# Patient Record
Sex: MALE | Race: WHITE | NOT HISPANIC OR LATINO | Employment: OTHER | URBAN - METROPOLITAN AREA
[De-identification: names, ages, dates, MRNs, and addresses within clinical notes are randomized per-mention and may not be internally consistent; named-entity substitution may affect disease eponyms.]

---

## 2022-03-07 ENCOUNTER — LAB REQUISITION (OUTPATIENT)
Dept: LAB | Facility: CLINIC | Age: 62
End: 2022-03-07

## 2022-03-07 DIAGNOSIS — E11.49 TYPE 2 DIABETES MELLITUS WITH OTHER DIABETIC NEUROLOGICAL COMPLICATION (H): ICD-10-CM

## 2022-03-07 DIAGNOSIS — E78.00 PURE HYPERCHOLESTEROLEMIA, UNSPECIFIED: ICD-10-CM

## 2022-03-07 LAB
ALBUMIN SERPL-MCNC: 3.9 G/DL (ref 3.5–5)
ALP SERPL-CCNC: 147 U/L (ref 45–120)
ALT SERPL W P-5'-P-CCNC: 16 U/L (ref 0–45)
ANION GAP SERPL CALCULATED.3IONS-SCNC: 16 MMOL/L (ref 5–18)
AST SERPL W P-5'-P-CCNC: 10 U/L (ref 0–40)
BILIRUB SERPL-MCNC: 0.3 MG/DL (ref 0–1)
BUN SERPL-MCNC: 19 MG/DL (ref 8–22)
CALCIUM SERPL-MCNC: 9.9 MG/DL (ref 8.5–10.5)
CHLORIDE BLD-SCNC: 101 MMOL/L (ref 98–107)
CHOLEST SERPL-MCNC: 170 MG/DL
CO2 SERPL-SCNC: 21 MMOL/L (ref 22–31)
CREAT SERPL-MCNC: 1 MG/DL (ref 0.7–1.3)
GFR SERPL CREATININE-BSD FRML MDRD: 85 ML/MIN/1.73M2
GLUCOSE BLD-MCNC: 303 MG/DL (ref 70–125)
HDLC SERPL-MCNC: 41 MG/DL
LDLC SERPL CALC-MCNC: 61 MG/DL
LDLC SERPL CALC-MCNC: ABNORMAL MG/DL
POTASSIUM BLD-SCNC: 4.3 MMOL/L (ref 3.5–5)
PROT SERPL-MCNC: 6.7 G/DL (ref 6–8)
SODIUM SERPL-SCNC: 138 MMOL/L (ref 136–145)
TRIGL SERPL-MCNC: 500 MG/DL

## 2022-03-07 PROCEDURE — 80061 LIPID PANEL: CPT | Performed by: PHYSICIAN ASSISTANT

## 2022-03-07 PROCEDURE — 80053 COMPREHEN METABOLIC PANEL: CPT | Performed by: PHYSICIAN ASSISTANT

## 2022-03-07 PROCEDURE — 83721 ASSAY OF BLOOD LIPOPROTEIN: CPT | Performed by: PHYSICIAN ASSISTANT

## 2022-10-04 ENCOUNTER — MEDICAL CORRESPONDENCE (OUTPATIENT)
Dept: NEUROSURGERY | Facility: CLINIC | Age: 62
End: 2022-10-04

## 2022-10-05 ENCOUNTER — OFFICE VISIT (OUTPATIENT)
Dept: NEUROSURGERY | Facility: CLINIC | Age: 62
End: 2022-10-05
Payer: COMMERCIAL

## 2022-10-05 VITALS
HEIGHT: 70 IN | HEART RATE: 105 BPM | WEIGHT: 214 LBS | DIASTOLIC BLOOD PRESSURE: 95 MMHG | BODY MASS INDEX: 30.64 KG/M2 | OXYGEN SATURATION: 97 % | SYSTOLIC BLOOD PRESSURE: 145 MMHG

## 2022-10-05 DIAGNOSIS — H53.9 VISION DISTURBANCE: ICD-10-CM

## 2022-10-05 DIAGNOSIS — M54.2 NECK PAIN ON LEFT SIDE: Primary | ICD-10-CM

## 2022-10-05 PROCEDURE — 99204 OFFICE O/P NEW MOD 45 MIN: CPT | Performed by: NURSE PRACTITIONER

## 2022-10-05 RX ORDER — ATORVASTATIN CALCIUM 80 MG/1
80 TABLET, FILM COATED ORAL
COMMUNITY
Start: 2022-09-07 | End: 2024-01-11

## 2022-10-05 RX ORDER — CYCLOBENZAPRINE HCL 10 MG
10 TABLET ORAL 2 TIMES DAILY PRN
COMMUNITY
Start: 2022-03-21 | End: 2023-08-15

## 2022-10-05 RX ORDER — AMLODIPINE AND BENAZEPRIL HYDROCHLORIDE 5; 20 MG/1; MG/1
1 CAPSULE ORAL DAILY
COMMUNITY
Start: 2022-05-24 | End: 2023-08-15

## 2022-10-05 RX ORDER — SEMAGLUTIDE 1.34 MG/ML
INJECTION, SOLUTION SUBCUTANEOUS
COMMUNITY
Start: 2022-07-20 | End: 2023-12-08

## 2022-10-05 RX ORDER — CARVEDILOL 6.25 MG/1
1 TABLET ORAL 2 TIMES DAILY WITH MEALS
COMMUNITY
Start: 2022-03-30 | End: 2023-12-08

## 2022-10-05 RX ORDER — LOSARTAN POTASSIUM 25 MG/1
25 TABLET ORAL DAILY
COMMUNITY
Start: 2022-09-07 | End: 2023-08-15

## 2022-10-05 NOTE — PATIENT INSTRUCTIONS
Obtain films from [place]   Physical therapy   Spine center in [place] referral for injection possibility, pain control options   Neurology consult to establish care

## 2022-10-05 NOTE — PROGRESS NOTES
NEUROSURGERY CONSULTATION NOTE  10/5/2022     CHIEF COMPLAINT: neck pain    HPI:    Lito Malone is a 61 year old male who is sent to us in consultation by Dr Eason for evaluation of neck pain, back pain. Symptoms started after J&J COVID vaccine May 2021. He was hospitalized not being able to move his legs, significant neck pain and development of double vision. Back pain is better. Mobility is better but neck pain and vision disturbance persist. He is here to establish care. He brings extensive medical records from [place]. He had a very thorough work up. End result is some type of autoimmune disorder he has been told. He has not established with neurology or rheumatology here in the [facility]. He has not ever had any injections for pain. Has not had any recent PT. He has a history of ACDF C5-7 20 years ago. No pain until this past year 2021 after vaccine. History of two prior lumbar discectomies. Pain is present posterior midline neck, left shoulder radiating into left bicep, deltoid, tricep but pain does not go past the elbow. Occasionally in the right shoulder. No numbness or tingling. Equilibrium is altered - likely due to binocular diplopia. Wears eye patch. Has established with eye doctor in the [facility]. Low back not bothersome for 8-9 months. He has pain medication and muscle relaxer's he takes limitedly. In [place] saw neurology, neurosurgery, rheumatology and was told nothing to do. He declined participation in study regarding side effects of COVID vaccine. On exam he has pain limiting strength left arm, hand grasp. Based on image reports (not actual images) he has mild central stenosis C4-5, multilevel foraminal narrowing, moderate left C7-T1, severe left C6-7. I would like him to start with PT and see the providers at the [facility] for possible injection. We will obtain the actual films from [place] to review. Andres is happy with this plan. He will return to see us if the Spine Center thinks he  "would benefit from possible foraminotomy unless something different is noted on images once received.     MRI 11/3/2021 mild central canal stenosis C4-5, multilevel foraminal narrowing most significant C6-C7, C7-T1 based on radiology report not actual films   MRI brain 7/2021 negative   MRI lumbar degenerative changes     Facet arthrosis - C4-5 no stenosis noted on report     PLAN:   1. Spine Center   2. PT  3. Neurology - [facility] care   4. Get films from [place]   5. He was not interested in rheumatology     No past medical history on file.  No past surgical history on file.     REVIEW OF SYSTEMS:  Negative with exception of HPI     MEDICATIONS:  No current outpatient medications on file.         ALLERGIES/SENSITIVITIES:     Not on File    PERTINENT SOCIAL HISTORY:   Social History     Socioeconomic History     Marital status: Patient Declined         FAMILY HISTORY:  No family history on file.     PHYSICAL EXAM:     Constitution: BP (!) 145/95   Pulse 105   Ht 5' 10\" (1.778 m)   Wt 214 lb (97.1 kg)   SpO2 97%   BMI 30.71 kg/m      General: Awake, alert and in NAD     Motor: Normal bulk and tone all muscle groups of upper and lower extremities.     Strength:     Right Left  Right Left   Deltoid 5 5 Hip flexion 5 5   Biceps 5 5 Hip extension 5 5   Triceps 5 5 Knee flexion 5 5   Wrist ex 5 5 Knee ex 5 5   Wrist flex 5 5 Dorsiflex 5 5   Finger ex 5 5 Plantar flex 5 5   Hand intrinsic 5 4 EHL 5 5    Full 4+         Sensory: Sensation intact bilaterally to light touch and temperature throughout.      Coordination:  Gait is WNL.      Reflexes; 2+ supinator, biceps, triceps. 2+ patellar and achilles. No milan. No clonus. Toes are down-going bilaterally    Musculoskeletal: Negative straight leg raise bilaterally. Negative TRAVIS testing. Negative Lisandro finger test    IMAGING: I personally reviewed some of the reports of multiple images from [place] of brain, cervical, lumbar.     CONSULTATION ASSESSMENT " AND PLAN:    Lito Malone is a 61 year old male who is sent to us in consultation by Dr Eason for evaluation of neck pain, back pain. Symptoms started after J&J COVID vaccine May 2021. He has a history of ACDF C5-7 20 years ago. No pain until this past year 2021 after vaccine. History of two prior lumbar discectomies. Pain is present posterior midline neck, left shoulder radiating into left bicep, deltoid, tricep but does not go past the elbow. Occasionally in the right shoulder. No numbness or tingling. Equilibrium is altered - likely due to binocular diplopia. Wears eye patch. Has established with eye doctor in the [facility]. Low back not bothersome for 8-9 months. He has pain medication and muscle relaxer's he takes limitedly. In [place] saw neurology, neurosurgery, rheumatology and was told nothing to do. He declined participation in study regarding side effects of COVID vaccine. On exam he has pain limiting strength left arm, hand grasp. Based on image reports (not actual images) He has mild central stenosis C4-5, multilevel foraminal narrowing most significant C6-C7, C7-T1. I would like him to start with PT and see the providers at the [facility] Center for possible injection. We will obtain the actual films from [place] to review. Andres is happy with this plan. He will return to see us if the Spine Center feels he would benefit from foraminotomy unless something else shows up once images reviewed. The distribution of his symptoms do not necessarily fit his image report.     MRI 11/3/2021 mild central canal stenosis C4-5 multifocal neural foraminal stenosis. Moderate left C7-T1. Severe left C6-7.   MRI brain 7/2021 negative   MRI lumbar degenerative changes      Rakel Arita, APRN CNP   [facility]        Cc:   No primary care provider on file.

## 2022-10-07 ENCOUNTER — MEDICAL CORRESPONDENCE (OUTPATIENT)
Dept: NEUROSURGERY | Facility: CLINIC | Age: 62
End: 2022-10-07

## 2022-10-18 ENCOUNTER — MEDICAL CORRESPONDENCE (OUTPATIENT)
Dept: NEUROSURGERY | Facility: CLINIC | Age: 62
End: 2022-10-18

## 2022-11-02 ENCOUNTER — OFFICE VISIT (OUTPATIENT)
Dept: PHYSICAL MEDICINE AND REHAB | Facility: CLINIC | Age: 62
End: 2022-11-02
Payer: COMMERCIAL

## 2022-11-02 VITALS
BODY MASS INDEX: 30.64 KG/M2 | SYSTOLIC BLOOD PRESSURE: 178 MMHG | DIASTOLIC BLOOD PRESSURE: 86 MMHG | HEART RATE: 97 BPM | HEIGHT: 70 IN | WEIGHT: 214 LBS

## 2022-11-02 DIAGNOSIS — M25.512 CHRONIC LEFT SHOULDER PAIN: ICD-10-CM

## 2022-11-02 DIAGNOSIS — M47.812 ARTHROPATHY OF CERVICAL FACET JOINT: ICD-10-CM

## 2022-11-02 DIAGNOSIS — G89.29 CHRONIC LEFT SHOULDER PAIN: ICD-10-CM

## 2022-11-02 DIAGNOSIS — M79.18 MYOFASCIAL PAIN: ICD-10-CM

## 2022-11-02 DIAGNOSIS — M54.2 CERVICAL SPINE PAIN: Primary | ICD-10-CM

## 2022-11-02 PROCEDURE — 99204 OFFICE O/P NEW MOD 45 MIN: CPT | Performed by: PHYSICAL MEDICINE & REHABILITATION

## 2022-11-02 RX ORDER — GABAPENTIN 100 MG/1
100 CAPSULE ORAL 3 TIMES DAILY
COMMUNITY
End: 2023-08-15

## 2022-11-02 RX ORDER — BACLOFEN 10 MG/1
10 TABLET ORAL 3 TIMES DAILY PRN
Qty: 60 TABLET | Refills: 1 | Status: SHIPPED | OUTPATIENT
Start: 2022-11-02 | End: 2023-08-17

## 2022-11-02 ASSESSMENT — PAIN SCALES - GENERAL: PAINLEVEL: MODERATE PAIN (5)

## 2022-11-02 NOTE — PATIENT INSTRUCTIONS
A physical therapy order was provided for you today.  You will be contacted by physical therapy.  If nobody contacts you within 3 to 5 days, please contact the clinic at [phone].  It will be very important for you to do your physical therapy exercises on a regular basis to decrease your pain and prevent future pain flares.   Baclofen (muscle relaxant medication) has been prescribed today. Please take 5-10mg three times daily as needed. This medication may cause drowsiness. Please do not work or drive while taking this medication until you know how it effects you. If it does make you drowsy, you should only take it before bedtime or at times that you do not have to work/drive.   An xray was ordered for you today.  Please call Radiology at [phone].     Get MRI images from [place]

## 2022-11-02 NOTE — LETTER
11/2/2022         RE: Lito Malone  [address] [address]        Dear Colleague,    Thank you for referring your patient, Lito Malone, to the [facility]. Please see a copy of my visit note below.    Assessment/Plan:      Lito was seen today for neck pain.    Diagnoses and all orders for this visit:    Cervical spine pain  -     XR Cervical Spine Flex/Ext 2/3 Views; Future  -     XR Shoulder Left 2 Views; Future  -     baclofen (LIORESAL) 10 MG tablet; Take 1 tablet (10 mg) by mouth 3 times daily as needed for muscle spasms  -     Physical Therapy Referral; Future    Myofascial pain  -     XR Shoulder Left 2 Views; Future  -     baclofen (LIORESAL) 10 MG tablet; Take 1 tablet (10 mg) by mouth 3 times daily as needed for muscle spasms  -     Physical Therapy Referral; Future    Arthropathy of cervical facet joint  -     XR Cervical Spine Flex/Ext 2/3 Views; Future  -     Physical Therapy Referral; Future    Chronic left shoulder pain  -     XR Shoulder Left 2 Views; Future  -     Physical Therapy Referral; Future    Other orders  -     Spine  Referral         Assessment: Pleasant 61 year old male with a history of hypertension, hyperlipidemia, diabetes mellitus, history of myocardial infarction with:    1.  Chronic cervical spine pain increased over the past year at the cervical thoracic junction.  He is status post C5-7 ACDF in the year 2000.  He has severe facet arthropathy at C7-T1 on MRI report no images available for my review.  Pain is consistent with facet arthropathy at that level along with myofascial pain.    2.  Chronic left shoulder pain with decreased internal and external rotation consistent with glenohumeral joint osteoarthritis.  Likely contributing to his pain.          Discussion:    1.   I discussed the diagnosis and treatment options.  We discussed the need for images.  I have no images available for my review and he  has had an MRI a year ago in [place].  We also discussed the options of physical therapy.    2.  Plain film cervical spine flexion-extension to evaluate for any instability and integrity of the fusion.    3.  X-rays of left shoulder to evaluate extent of glenohumeral joint osteoarthritis.    4.  Trial baclofen 5 to 10 mg 3 times daily as needed for myofascial pain.  He is going to start this at night.    5.  We will have him sign a release to obtain the MRI images of his cervical spine and lumbar spine from [place].    6.  Start physical therapy for cervical and parascapular strengthening stabilization.    7.  Follow-up with me in 1 month.    It was our pleasure caring for your patient today, if there any questions or concerns please do not hesitate to contact us.      Subjective:   Patient ID: Lito Malone is a 61 year old male.    History of Present Illness:Patient presents at the request of Rakel Arita CNP for evaluation of cervical spine pain.  He states he had a C5-7 fusion in the year 2000 was living in [place].  Was doing well until had the Roney & Roney vaccine over a year ago.  He then developed double vision issues was hospitalized and sent ago diagnosed with an autoimmune dysfunction following vaccine.  He wears an eye patch in his right eye due to double vision.    About 1 year ago during this hospitalization episode with the vaccine he began to have cervical spine pain waxes and wanes in intensity through the cervical spine upper trapezius and into the left shoulder deltoids greater than right.  He has left shoulder pain worse with pushing up from seated or supine.  Better with ibuprofen.  Feels weakness to the shoulders.  His neck pain is intermittent as well but most severe at the cervical thoracic junction bilaterally left is much worse than right.  Worse with turning his head.  Has occasional numbness and tingling to the right hand all the fingertips.  He has poor sleep.  His pain  is a 10/10 at worst 5/10 today 4/10 at best.  He has moved to [place] to help care for his mother.      Imaging: MRI Report of the cervical spine was reviewed.  No images available.  This is from November 2021 from [place].  Report reads advanced facet arthropathy C7-T1 no central stenosis.  Prior C5-7 ACDF mild central stenosis C4-5.    Review of Systems: Complains of headaches, joint pain, muscle pain, change in vision, skin wound issues and dizziness.  Denies fevers, chills, weight loss or weight gain, hoarseness, eye pain, chest pain, palpitations, shortness of breath, abdominal pain, nausea, vomiting, enlarged lymph nodes, bowel or bladder incontinence, depression. Remainder of 12 point review systems negative unless listed above.    Past Medical History:   Diagnosis Date     Diabetes (H)      Heart disease      Hyperlipidemia      Hypertension        Family History   Problem Relation Age of Onset     Diabetes Father      Coronary Artery Disease Father          Social History     Socioeconomic History     Marital status: Patient Declined     Spouse name: None     Number of children: None     Years of education: None     Highest education level: None   Tobacco Use     Smoking status: Every Day     Smokeless tobacco: Former   Substance and Sexual Activity     Alcohol use: Not Currently     Drug use: Never     Social history: Moved back from [place] to take care of his mother.  Currently not working.  Does smoke cigarettes encourage smoking cessation.  No alcohol.    The following portions of the patient's history were reviewed and updated as appropriate: allergies, current medications, past family history, past medical history, past social history, past surgical history and problem list.    Oswestry (WINNIE) Questionnaire    No flowsheet data found.    Neck Disability Index:  Neck Disability Index (  [employer]. [employer] Pina C. 1991. All rights reserved.; used with permission) 11/2/2022   SECTION 1 - PAIN  "INTENSITY 2   SECTION 2 - PERSONAL CARE 0   SECTION 3 - LIFTING 2   SECTION 4 - READING 2   SECTION 5 - HEADACHES 3   SECTION 6 - CONCENTRATION 0   SECTION 7 - WORK 3   SECTION 8 - DRIVING 2   SECTION 9 - SLEEPING 3   SECTION 10 - RECREATION 4   Count 10   Sum 21   Raw Score: /50 21   Neck Disability Index Score: (%) 42          PHQ-2 Score:     PHQ-2 ( 1999 [employer]) 11/2/2022   Q1: Little interest or pleasure in doing things 0   Q2: Feeling down, depressed or hopeless 0   PHQ-2 Score 0                  Objective:   Physical Exam:    Ht 5' 10\" (1.778 m)   Wt 214 lb (97.1 kg)   BMI 30.71 kg/m    Body mass index is 30.71 kg/m .      General:  Well-appearing male in no acute distress.  Pleasant, cooperative, and interactive throughout the examination and interview.  CV: No lower extremity edema on inspection or paltation.  Lymphatics: No cervical lymphadenopathy palpated.  Eyes: sclera clear bilaterally but wears eye patch on right eye for double vision.  Skin: No rashes or lesions seen over the head/neck, hairline, arms, legs .  Respirations unlabored.  MSK: Gait is normal.  Able to heel-toe walk without difficulty.  Negative Romberg.  Spine: normal AP curves of the C, T, and L spine.  Full range of motion in the cervical spine in all planes.  Palpation: Tenderness to palpation over cervical thoracic junction bilaterally over the facets and along the upper trapezius to a lesser degree with hypertonic tissue textures.  Extremities: Decreased range of motion left greater than right shoulder abduction and significantly reduced external rotation and internal rotation of left shoulder.  Pain arm drop empty can and speeds test on the left.  Full range of motion of the  elbows, and wrists with no effusions or tenderness to palpation.   .  Full range of motion of the  knees, and ankles from a seated position with no effusions or tenderness to palpation. No hypermobility of the upper or lower extremities.  Neurologic exam: " Mental status: Patient is alert and oriented with normal affect.  Attention, knowledge, memory, and language are intact.  Normal coordination throughout the examination.  Reflexes are 2+ and symmetric biceps, triceps, brachioradialis, patellar, and 0 Achilles with Negative Alejandra's.  Sensation is slightly reduced to light touch bilateral feet to the ankles, intact to light touch throughout the remainder of upper and lower extremities bilaterally.  Manual muscle testing reveals 5 out of 5 strength in the shoulder abductors, elbow flexors/extensors, wrist extensors, interosseous, and finger flexors; lower extremity strength appears grossly normal.   Normal muscle bulk and tone in the arms and legs.  Negative Spurling's test bilaterally.  Positive Copeland test to the left cervical spine.            Again, thank you for allowing me to participate in the care of your patient.        Sincerely,        Max Kong, DO

## 2022-11-02 NOTE — PROGRESS NOTES
Assessment/Plan:      Lito was seen today for neck pain.    Diagnoses and all orders for this visit:    Cervical spine pain  -     XR Cervical Spine Flex/Ext 2/3 Views; Future  -     XR Shoulder Left 2 Views; Future  -     baclofen (LIORESAL) 10 MG tablet; Take 1 tablet (10 mg) by mouth 3 times daily as needed for muscle spasms  -     Physical Therapy Referral; Future    Myofascial pain  -     XR Shoulder Left 2 Views; Future  -     baclofen (LIORESAL) 10 MG tablet; Take 1 tablet (10 mg) by mouth 3 times daily as needed for muscle spasms  -     Physical Therapy Referral; Future    Arthropathy of cervical facet joint  -     XR Cervical Spine Flex/Ext 2/3 Views; Future  -     Physical Therapy Referral; Future    Chronic left shoulder pain  -     XR Shoulder Left 2 Views; Future  -     Physical Therapy Referral; Future    Other orders  -     Spine  Referral         Assessment: Pleasant 61 year old male with a history of hypertension, hyperlipidemia, diabetes mellitus, history of myocardial infarction with:    1.  Chronic cervical spine pain increased over the past year at the cervical thoracic junction.  He is status post C5-7 ACDF in the year 2000.  He has severe facet arthropathy at C7-T1 on MRI report no images available for my review.  Pain is consistent with facet arthropathy at that level along with myofascial pain.    2.  Chronic left shoulder pain with decreased internal and external rotation consistent with glenohumeral joint osteoarthritis.  Likely contributing to his pain.          Discussion:    1.   I discussed the diagnosis and treatment options.  We discussed the need for images.  I have no images available for my review and he has had an MRI a year ago in [place].  We also discussed the options of physical therapy.    2.  Plain film cervical spine flexion-extension to evaluate for any instability and integrity of the fusion.    3.  X-rays of left shoulder to evaluate extent of glenohumeral  joint osteoarthritis.    4.  Trial baclofen 5 to 10 mg 3 times daily as needed for myofascial pain.  He is going to start this at night.    5.  We will have him sign a release to obtain the MRI images of his cervical spine and lumbar spine from [place].    6.  Start physical therapy for cervical and parascapular strengthening stabilization.    7.  Follow-up with me in 1 month.    It was our pleasure caring for your patient today, if there any questions or concerns please do not hesitate to contact us.      Subjective:   Patient ID: Lito Malone is a 61 year old male.    History of Present Illness:Patient presents at the request of Rakel Arita CNP for evaluation of cervical spine pain.  He states he had a C5-7 fusion in the year 2000 was living in [place].  Was doing well until had the Roney & Roney vaccine over a year ago.  He then developed double vision issues was hospitalized and sent ago diagnosed with an autoimmune dysfunction following vaccine.  He wears an eye patch in his right eye due to double vision.    About 1 year ago during this hospitalization episode with the vaccine he began to have cervical spine pain waxes and wanes in intensity through the cervical spine upper trapezius and into the left shoulder deltoids greater than right.  He has left shoulder pain worse with pushing up from seated or supine.  Better with ibuprofen.  Feels weakness to the shoulders.  His neck pain is intermittent as well but most severe at the cervical thoracic junction bilaterally left is much worse than right.  Worse with turning his head.  Has occasional numbness and tingling to the right hand all the fingertips.  He has poor sleep.  His pain is a 10/10 at worst 5/10 today 4/10 at best.  He has moved to [place] to help care for his mother.      Imaging: MRI Report of the cervical spine was reviewed.  No images available.  This is from November 2021 from [place].  Report reads advanced facet arthropathy  C7-T1 no central stenosis.  Prior C5-7 ACDF mild central stenosis C4-5.    Review of Systems: Complains of headaches, joint pain, muscle pain, change in vision, skin wound issues and dizziness.  Denies fevers, chills, weight loss or weight gain, hoarseness, eye pain, chest pain, palpitations, shortness of breath, abdominal pain, nausea, vomiting, enlarged lymph nodes, bowel or bladder incontinence, depression. Remainder of 12 point review systems negative unless listed above.    Past Medical History:   Diagnosis Date     Diabetes (H)      Heart disease      Hyperlipidemia      Hypertension        Family History   Problem Relation Age of Onset     Diabetes Father      Coronary Artery Disease Father          Social History     Socioeconomic History     Marital status: Patient Declined     Spouse name: None     Number of children: None     Years of education: None     Highest education level: None   Tobacco Use     Smoking status: Every Day     Smokeless tobacco: Former   Substance and Sexual Activity     Alcohol use: Not Currently     Drug use: Never     Social history: Moved back from [place] to take care of his mother.  Currently not working.  Does smoke cigarettes encourage smoking cessation.  No alcohol.    The following portions of the patient's history were reviewed and updated as appropriate: allergies, current medications, past family history, past medical history, past social history, past surgical history and problem list.    Oswestry (WINNIE) Questionnaire    No flowsheet data found.    Neck Disability Index:  Neck Disability Index (  [employer] H. and Pina C. 1991. All rights reserved.; used with permission) 11/2/2022   SECTION 1 - PAIN INTENSITY 2   SECTION 2 - PERSONAL CARE 0   SECTION 3 - LIFTING 2   SECTION 4 - READING 2   SECTION 5 - HEADACHES 3   SECTION 6 - CONCENTRATION 0   SECTION 7 - WORK 3   SECTION 8 - DRIVING 2   SECTION 9 - SLEEPING 3   SECTION 10 - RECREATION 4   Count 10   Sum 21   Raw Score:  "/50 21   Neck Disability Index Score: (%) 42          PHQ-2 Score:     PHQ-2 ( 1999 Pfizer) 11/2/2022   Q1: Little interest or pleasure in doing things 0   Q2: Feeling down, depressed or hopeless 0   PHQ-2 Score 0                  Objective:   Physical Exam:    Ht 5' 10\" (1.778 m)   Wt 214 lb (97.1 kg)   BMI 30.71 kg/m    Body mass index is 30.71 kg/m .      General:  Well-appearing male in no acute distress.  Pleasant, cooperative, and interactive throughout the examination and interview.  CV: No lower extremity edema on inspection or paltation.  Lymphatics: No cervical lymphadenopathy palpated.  Eyes: sclera clear bilaterally but wears eye patch on right eye for double vision.  Skin: No rashes or lesions seen over the head/neck, hairline, arms, legs .  Respirations unlabored.  MSK: Gait is normal.  Able to heel-toe walk without difficulty.  Negative Romberg.  Spine: normal AP curves of the C, T, and L spine.  Full range of motion in the cervical spine in all planes.  Palpation: Tenderness to palpation over cervical thoracic junction bilaterally over the facets and along the upper trapezius to a lesser degree with hypertonic tissue textures.  Extremities: Decreased range of motion left greater than right shoulder abduction and significantly reduced external rotation and internal rotation of left shoulder.  Pain arm drop empty can and speeds test on the left.  Full range of motion of the  elbows, and wrists with no effusions or tenderness to palpation.   .  Full range of motion of the  knees, and ankles from a seated position with no effusions or tenderness to palpation. No hypermobility of the upper or lower extremities.  Neurologic exam: Mental status: Patient is alert and oriented with normal affect.  Attention, knowledge, memory, and language are intact.  Normal coordination throughout the examination.  Reflexes are 2+ and symmetric biceps, triceps, brachioradialis, patellar, and 0 Achilles with Negative " Alejandra's.  Sensation is slightly reduced to light touch bilateral feet to the ankles, intact to light touch throughout the remainder of upper and lower extremities bilaterally.  Manual muscle testing reveals 5 out of 5 strength in the shoulder abductors, elbow flexors/extensors, wrist extensors, interosseous, and finger flexors; lower extremity strength appears grossly normal.   Normal muscle bulk and tone in the arms and legs.  Negative Spurling's test bilaterally.  Positive Copeland test to the left cervical spine.

## 2022-11-08 ENCOUNTER — HOSPITAL ENCOUNTER (OUTPATIENT)
Dept: RADIOLOGY | Facility: CLINIC | Age: 62
Discharge: HOME | End: 2022-11-08
Attending: PHYSICAL MEDICINE & REHABILITATION
Payer: COMMERCIAL

## 2022-11-08 DIAGNOSIS — M25.512 CHRONIC LEFT SHOULDER PAIN: ICD-10-CM

## 2022-11-08 DIAGNOSIS — G89.29 CHRONIC LEFT SHOULDER PAIN: ICD-10-CM

## 2022-11-08 DIAGNOSIS — M79.18 MYOFASCIAL PAIN: ICD-10-CM

## 2022-11-08 DIAGNOSIS — M47.812 ARTHROPATHY OF CERVICAL FACET JOINT: ICD-10-CM

## 2022-11-08 DIAGNOSIS — M54.2 CERVICAL SPINE PAIN: ICD-10-CM

## 2022-11-08 PROCEDURE — 73030 X-RAY EXAM OF SHOULDER: CPT | Mod: LT

## 2022-11-08 PROCEDURE — 72040 X-RAY EXAM NECK SPINE 2-3 VW: CPT

## 2023-01-06 ENCOUNTER — LAB REQUISITION (OUTPATIENT)
Dept: LAB | Facility: CLINIC | Age: 63
End: 2023-01-06

## 2023-01-06 DIAGNOSIS — E11.65 TYPE 2 DIABETES MELLITUS WITH HYPERGLYCEMIA (H): ICD-10-CM

## 2023-01-06 LAB
CREAT UR-MCNC: 47 MG/DL
MICROALBUMIN UR-MCNC: 611 MG/L
MICROALBUMIN/CREAT UR: 1300 MG/G CR (ref 0–17)

## 2023-01-06 PROCEDURE — 82570 ASSAY OF URINE CREATININE: CPT | Performed by: PHYSICIAN ASSISTANT

## 2023-05-05 ENCOUNTER — LAB REQUISITION (OUTPATIENT)
Dept: LAB | Facility: CLINIC | Age: 63
End: 2023-05-05

## 2023-05-05 DIAGNOSIS — E78.00 PURE HYPERCHOLESTEROLEMIA, UNSPECIFIED: ICD-10-CM

## 2023-05-05 DIAGNOSIS — I10 ESSENTIAL (PRIMARY) HYPERTENSION: ICD-10-CM

## 2023-05-05 LAB
ANION GAP SERPL CALCULATED.3IONS-SCNC: 13 MMOL/L (ref 7–15)
BUN SERPL-MCNC: 13.4 MG/DL (ref 8–23)
CALCIUM SERPL-MCNC: 9.7 MG/DL (ref 8.8–10.2)
CHLORIDE SERPL-SCNC: 100 MMOL/L (ref 98–107)
CHOLEST SERPL-MCNC: 96 MG/DL
CREAT SERPL-MCNC: 0.96 MG/DL (ref 0.67–1.17)
DEPRECATED HCO3 PLAS-SCNC: 27 MMOL/L (ref 22–29)
GFR SERPL CREATININE-BSD FRML MDRD: 88 ML/MIN/1.73M2
GLUCOSE SERPL-MCNC: 185 MG/DL (ref 70–99)
HDLC SERPL-MCNC: 41 MG/DL
LDLC SERPL CALC-MCNC: 36 MG/DL
NONHDLC SERPL-MCNC: 55 MG/DL
POTASSIUM SERPL-SCNC: 5 MMOL/L (ref 3.4–5.3)
SODIUM SERPL-SCNC: 140 MMOL/L (ref 136–145)
TRIGL SERPL-MCNC: 95 MG/DL

## 2023-05-05 PROCEDURE — 80048 BASIC METABOLIC PNL TOTAL CA: CPT | Performed by: PHYSICIAN ASSISTANT

## 2023-05-05 PROCEDURE — 80061 LIPID PANEL: CPT | Performed by: PHYSICIAN ASSISTANT

## 2023-05-22 ENCOUNTER — OFFICE VISIT (OUTPATIENT)
Dept: NEUROLOGY | Facility: CLINIC | Age: 63
End: 2023-05-22
Attending: NURSE PRACTITIONER
Payer: COMMERCIAL

## 2023-05-22 VITALS — SYSTOLIC BLOOD PRESSURE: 140 MMHG | DIASTOLIC BLOOD PRESSURE: 85 MMHG | HEART RATE: 95 BPM

## 2023-05-22 DIAGNOSIS — G61.0 AIDP (ACUTE INFLAMMATORY DEMYELINATING POLYNEUROPATHY) (H): Primary | ICD-10-CM

## 2023-05-22 DIAGNOSIS — R13.10 DYSPHAGIA, UNSPECIFIED TYPE: ICD-10-CM

## 2023-05-22 DIAGNOSIS — H53.9 VISION DISTURBANCE: ICD-10-CM

## 2023-05-22 PROCEDURE — 99205 OFFICE O/P NEW HI 60 MIN: CPT | Performed by: PSYCHIATRY & NEUROLOGY

## 2023-05-22 NOTE — PATIENT INSTRUCTIONS
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  Your exam is reassuring, but overall I think we should have you repeat an EMG given your hand dermatome psoriasis and ongoing sensory loss of the legs.    - EMG one lower and one upper  - SLP

## 2023-05-22 NOTE — PROGRESS NOTES
[facility] Neurology Consultation    Lito Malone MRN# [id number]   Age: 62 year old YOB: 1960     Requesting physician: Ike Jung     Reason for Consultation: neck pain      History of Presenting Symptoms:   Lito Malone is a 62 year old male who presents today for evaluation of neck pain.    The patient has a pertinent medical history of ACDF C5-7 some 20 years ago, two lumbar discectomies, DMII, HLD, STEMI (8/28/2022), and HTN.  Around 5/2021, after having the J&J COVID vaccine the patient was hospitalized fro not being able to move his legs, having neck pain, and diplopia while in [place].  When seen with Neurosurgery 10/5/2022, it was noted he had midline neck pain radiating into the left biceps,deltoid but without sensory loss with pain limiting strength on exam.  Imaging (MRI 11/3/2021) showed mild central canal stenosis at C4-5, and foraminal narrowing at C6-7, C7-T1.  MRI brain 7/2021 was reported as negative.  He was referred to establish care with neurology given his medical history.    Upon review, the patient was seen with ophthalmology 7/25/2022 were it was noted he had numerous MRI and CT scans for diplopia and weakness in his legs while in [place] 5/2021 and was told he had Guillain [place] by a neurologist, likely secondary to the vaccine as an autoimmune response.    Today, the patient indicate he was treated for AIDP with IVIG x 5 days when in [place].  He tells me he had LP, MRI, and was told he had an autoimmune response to the vaccine but was told he didn't have Guillain barre.  He did have an EMG, but doesn't recall the results.  He shows me today that he has psoriasis on his hands, which occurred 6 months following this vaccine.    He feels his weakness has improved to the point of walking, but still has balance and equilibrium issues (more to do with eyes).  He can get dizzy when taking a shower.  He doesn't really have falls, and still  has some foot drag at times.  He does confirm he has some minor neuropathy prior to the vaccine (may have weird or odd sensation in the feet b/l, no weakness).  He has some swallowing issues (he may have a pause/hiccup when swallowing) in the last year.  There are no new visual issues at this time.     Social History:   No major alcohol use disorder.       Medications:   Amlodipine  ASA 81 mg   Atorvastatin  Baclofen 10 mg TID  Carvedilol  Cyclobenzaprine 10 mg every day  Gabapentin 100 mg TID  Losartan  Metformin     Physical Exam:   Vitals: BP (!) 140/85 (BP Location: Right arm, Patient Position: Sitting)   Pulse 95    General: Seated comfortably in no acute distress.  HEENT: Neck supple with normal range of motion, no specific weakness noted.  Skin: No rashes  Neurologic:     Mental Status: Fully alert, attentive and oriented. Speech clear and fluent, no paraphasic errors.      Cranial Nerves: Visual fields intact, but horizontal diplopia is noted in central vision (If left eye is closed then the right field image goes away. If right eye is closed then the left field image goes away). PERRL. EOMI with vertical nystagmus with all motions (left gaze, right gaze, up-gaze) but no restriction of movement themselves is noted.  Facial sensation intact/symmetric. Facial movements symmetric. Hearing not formally tested but intact to conversation. Palate elevation symmetric, uvula midline. No dysarthria. Shoulder shrug strong bilaterally. Tongue protrusion midline. Pa-Portillo-Cah phoneme alteration is normal/w-out slurring.     Motor: No tremors or other abnormal movements observed. Muscle tone normal throughout. No pronator drift. Normal/symmetric rapid finger tapping. Strength 5/5 throughout upper and lower extremities except for mild (4+) weakness noted on right eversion.     Deep Tendon Reflexes: 2+/symmetric throughout upper and lower extremities. No clonus. Toes downgoing bilaterally.     Sensory: Upper extremities  without any deficits in sensation (light touch, vibration, pinprick, or temperature b/l) but there is a noted vesicular rash over the palmar surface median distribution present b/l.  In the lower extremities, there is spotty loss of pinprick and light touch differentiation (distal toes present, but lateral portion of right leg is poor while left leg is present, heel is poor b/l, MM is poor on left but present on right, mid-leg b/l is poor). Vibration is absent on right great toe but present for 2-4 seconds on left, vibration is absent on left MM but present 2-4 seconds on right, vibration reduced to 6-8 seconds at mid-leg and knees b/l. Proprioception intact 2/3 responses b/l at great toes. Temperature poor on right and left distal feet. Positive Romberg.      Coordination: Finger-nose-finger intact without dysmetria. Rapid alternating movements intact/symmetric with normal speed and rhythm.     Gait: Stands easily from seated position with arms across chest. Toe walking and heel walking without difficulty. Tandem walking is extremely poor with noted extra steps needed and crossing over at times on either side.         Data: Pertinent prior to visit   Imaging:  Reviewed today. Minor flair hyper-intensities are noted of periaqueductal grey matter, but could be artifact.  No infarction, tumor, or even a greater than expected degree of white matter microvascular changes are noted.    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  He has made an excellent recovery without noted respiratory dysfunction being noted, but still has some remaining diplopia and weakness in the distal extremities along with sensory loss that  seems mixed in a distal symmetric pattern.  I am not certain of the yield or reasoning behind obtaining ganglioside brendan panels at this time without having an EMG, so I would like this to be done with a close follow up.  I also am not certain as to the rash of his hands coming on following the vaccine without sensory loss or weakness but of which is in a median nerve distribution.  If these issues are linked, I think there is reason enough to warrant advanced autoimmune related marker testing through us or rheumatology.  Given his reports of a slight hiccup with his swallowing, I would want him to obtain a swallow study through our speech specialist.    Plan:  - EMG one arm, one leg (with Dr. Beckman)  - SLP evaluation for swallow    Follow up in Neurology clinic in 3 months or should new concerns arise.    SARIAH Garcia D.O.   of Neurology    Total time today (78 min) in this patient encounter was spent on pre-charting, counseling and/or coordination of care.  The patient is in agreement with this plan and has no further questions.

## 2023-05-22 NOTE — NURSING NOTE
Chief Complaint   Patient presents with     Neck Pain     Referred by SHRAVAN Matos RMA on 5/22/2023 at 7:05 AM

## 2023-05-22 NOTE — LETTER
5/22/2023         RE: Lito Malone  [address] [address]        Dear Colleague,    Thank you for referring your patient, Lito Malone, to the [facility] [place]. Please see a copy of my visit note below.    [facility] Neurology Consultation    Lito Malone MRN# [id number]   Age: 62 year old YOB: 1960     Requesting physician: Ike Jung     Reason for Consultation: neck pain      History of Presenting Symptoms:   Lito Malone is a 62 year old male who presents today for evaluation of neck pain.    The patient has a pertinent medical history of ACDF C5-7 some 20 years ago, two lumbar discectomies, DMII, HLD, STEMI (8/28/2022), and HTN.  Around 5/2021, after having the J&J COVID vaccine the patient was hospitalized fro not being able to move his legs, having neck pain, and diplopia while in [place].  When seen with Neurosurgery 10/5/2022, it was noted he had midline neck pain radiating into the left biceps,deltoid but without sensory loss with pain limiting strength on exam.  Imaging (MRI 11/3/2021) showed mild central canal stenosis at C4-5, and foraminal narrowing at C6-7, C7-T1.  MRI brain 7/2021 was reported as negative.  He was referred to establish care with neurology given his medical history.    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    Today, the patient indicate he was treated for AIDP with IVIG x 5 days when in [place].  He tells me he had LP, MRI, and was told he had an autoimmune response to the vaccine but was told he didn't have Guillain barre.  He did have an EMG, but doesn't recall the results.  He shows me today that he has  psoriasis on his hands, which occurred 6 months following this vaccine.    He feels his weakness has improved to the point of walking, but still has balance and equilibrium issues (more to do with eyes).  He can get dizzy when taking a shower.  He doesn't really have falls, and still has some foot drag at times.  He does confirm he has some minor neuropathy prior to the vaccine (may have weird or odd sensation in the feet b/l, no weakness).  He has some swallowing issues (he may have a pause/hiccup when swallowing) in the last year.  There are no new visual issues at this time.     Social History:   No major alcohol use disorder.       Medications:   Amlodipine  ASA 81 mg   Atorvastatin  Baclofen 10 mg TID  Carvedilol  Cyclobenzaprine 10 mg every day  Gabapentin 100 mg TID  Losartan  Metformin     Physical Exam:   Vitals: BP (!) 140/85 (BP Location: Right arm, Patient Position: Sitting)   Pulse 95    General: Seated comfortably in no acute distress.  HEENT: Neck supple with normal range of motion, no specific weakness noted.  Skin: No rashes  Neurologic:     Mental Status: Fully alert, attentive and oriented. Speech clear and fluent, no paraphasic errors.      Cranial Nerves: Visual fields intact, but horizontal diplopia is noted in central vision (If left eye is closed then the right field image goes away. If right eye is closed then the left field image goes away). PERRL. EOMI with vertical nystagmus with all motions (left gaze, right gaze, up-gaze) but no restriction of movement themselves is noted.  Facial sensation intact/symmetric. Facial movements symmetric. Hearing not formally tested but intact to conversation. Palate elevation symmetric, uvula midline. No dysarthria. Shoulder shrug strong bilaterally. Tongue protrusion midline. Pa-Portillo-Cah phoneme alteration is normal/w-out slurring.     Motor: No tremors or other abnormal movements observed. Muscle tone normal throughout. No pronator drift.  Normal/symmetric rapid finger tapping. Strength 5/5 throughout upper and lower extremities except for mild (4+) weakness noted on right eversion.     Deep Tendon Reflexes: 2+/symmetric throughout upper and lower extremities. No clonus. Toes downgoing bilaterally.     Sensory: Upper extremities without any deficits in sensation (light touch, vibration, pinprick, or temperature b/l) but there is a noted vesicular rash over the palmar surface median distribution present b/l.  In the lower extremities, there is spotty loss of pinprick and light touch differentiation (distal toes present, but lateral portion of right leg is poor while left leg is present, heel is poor b/l, MM is poor on left but present on right, mid-leg b/l is poor). Vibration is absent on right great toe but present for 2-4 seconds on left, vibration is absent on left MM but present 2-4 seconds on right, vibration reduced to 6-8 seconds at mid-leg and knees b/l. Proprioception intact 2/3 responses b/l at great toes. Temperature poor on right and left distal feet. Positive Romberg.      Coordination: Finger-nose-finger intact without dysmetria. Rapid alternating movements intact/symmetric with normal speed and rhythm.     Gait: Stands easily from seated position with arms across chest. Toe walking and heel walking without difficulty. Tandem walking is extremely poor with noted extra steps needed and crossing over at times on either side.         Data: Pertinent prior to visit   Imaging:  Reviewed today. Minor flair hyper-intensities are noted of periaqueductal grey matter, but could be artifact.  No infarction, tumor, or even a greater than expected degree of white matter microvascular changes are noted.    Laboratory:  Cannot review [place] results today (no EMG, LP, or serum studies available)         Assessment and Plan:   Assessment:  AIDP 2/2 vaccination    The patient has what seems to be a autoimmune response to a vaccine (COVID19 J&J) which  led to severe weakness, diplopia, and possibly some sensory loss in the setting of a known neuropathy.  He has made an excellent recovery without noted respiratory dysfunction being noted, but still has some remaining diplopia and weakness in the distal extremities along with sensory loss that seems mixed in a distal symmetric pattern.  I am not certain of the yield or reasoning behind obtaining ganglioside brendan panels at this time without having an EMG, so I would like this to be done with a close follow up.  I also am not certain as to the rash of his hands coming on following the vaccine without sensory loss or weakness but of which is in a median nerve distribution.  If these issues are linked, I think there is reason enough to warrant advanced autoimmune related marker testing through us or rheumatology.  Given his reports of a slight hiccup with his swallowing, I would want him to obtain a swallow study through our speech specialist.    Plan:  - EMG one arm, one leg (with Dr. Beckman)  - SLP evaluation for swallow    Follow up in Neurology clinic in 3 months or should new concerns arise.    SARIAH Garcia D.O.   of Neurology    Total time today (78 min) in this patient encounter was spent on pre-charting, counseling and/or coordination of care.  The patient is in agreement with this plan and has no further questions.        Again, thank you for allowing me to participate in the care of your patient.        Sincerely,        Shahab Garcia, DO

## 2023-05-30 ENCOUNTER — HOSPITAL ENCOUNTER (OUTPATIENT)
Dept: RADIOLOGY | Facility: CLINIC | Age: 63
Discharge: HOME | End: 2023-05-30
Attending: PSYCHIATRY & NEUROLOGY
Payer: COMMERCIAL

## 2023-05-30 ENCOUNTER — HOSPITAL ENCOUNTER (OUTPATIENT)
Dept: SPEECH THERAPY | Facility: CLINIC | Age: 63
Discharge: HOME | End: 2023-05-30
Attending: PSYCHIATRY & NEUROLOGY
Payer: COMMERCIAL

## 2023-05-30 DIAGNOSIS — G61.0 AIDP (ACUTE INFLAMMATORY DEMYELINATING POLYNEUROPATHY) (H): ICD-10-CM

## 2023-05-30 DIAGNOSIS — R13.10 DYSPHAGIA, UNSPECIFIED TYPE: ICD-10-CM

## 2023-05-30 PROCEDURE — 92611 MOTION FLUOROSCOPY/SWALLOW: CPT | Mod: GN

## 2023-05-30 PROCEDURE — 74230 X-RAY XM SWLNG FUNCJ C+: CPT

## 2023-05-30 NOTE — PROGRESS NOTES
SPEECH LANGUAGE PATHOLOGY EVALUATION    See electronic medical record for Abuse and Falls Screening details.    Subjective   Lito Malone is a 62 year old male who presents today for evaluation of neck pain.     The patient has a pertinent medical history of ACDF C5-7 some 20 years ago, two lumbar discectomies, DMII, HLD, STEMI (8/28/2022), and HTN.  Around 5/2021, after having the J&J COVID vaccine the patient was hospitalized fro not being able to move his legs, having neck pain, and diplopia while in [place].  When seen with Neurosurgery 10/5/2022, it was noted he had midline neck pain radiating into the left biceps,deltoid but without sensory loss with pain limiting strength on exam.  Imaging (MRI 11/3/2021) showed mild central canal stenosis at C4-5, and foraminal narrowing at C6-7, C7-T1.  MRI brain 7/2021 was reported as negative.  He was referred to establish care with neurology given his medical history.     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     Today, the patient indicate he was treated for AIDP with IVIG x 5 days when in [place].  He tells me he had LP, MRI, and was told he had an autoimmune response to the vaccine but was told he didn't have Guillain barre.  He did have an EMG, but doesn't recall the results.  He shows me today that he has psoriasis on his hands, which occurred 6 months following this vaccine.     He feels his weakness has improved to the point of walking, but still has balance and equilibrium issues (more to do with eyes).  He can get dizzy when taking a shower.  He doesn't really have falls, and still has some foot drag at times.  He does confirm he has some minor neuropathy prior to the vaccine (may have weird or odd  sensation in the feet b/l, no weakness).  He has some swallowing issues (he may have a pause/hiccup when swallowing) in the last year.  There are no new visual issues at this time.    Presenting condition or subjective complaint:  Pt describes having occasional 'hiccups' when eating. He does not report any concern with coughing during oral intake.  Date of onset: 5/30/23    Prior diagnostic imaging/testing results:    none noted   Prior therapy history for the same diagnosis, illness or injury:  NA         Objective     SWALLOW EVALUTION  Dysphagia history: pt has hx 'hiccups' with swallow at times but does not report feeling like he coughs with oral intake.  Current Diet/Method of Nutritional Intake: oral diet, thin liquids (level 0), regular diet        VIDEOFLUOROSCOPIC SWALLOW STUDY  Radiologist: Phalen  Views Taken: left lateral   Physical location of procedure: [facility]  Patient sitting upright on chair/stool     VFSS textures trialed:   VFSS Eval: Thin Liquids  Mode of Presentation: cup, self-fed   Order of Presentation:   Preparatory Phase: WFL  Oral Phase: WFL  Bolus Location When Swallow Initiated: posterior angle of ramus  Pharyngeal Phase: WFL  Rosenbeck's Penetration Aspiration Scale: 1 - no aspiration, contrast does not enter airway  Diagnostic Statement: see summary below     VFSS Eval: Moderate liquids  Mode of Presentation: spoon, fed by clinician   Order of Presentation:   Preparatory Phase: WFL  Oral Phase: WFL  Bolus Location When Swallow Initiated: posterior angle of ramus  Pharyngeal Phase: WFL  Rosenbeck's Penetration Aspiration Scale: 1 - no aspiration, contrast does not enter airway  Diagnostic Statement: see summary below     VFSS Eval: Soild  Mode of Presentation: spoon, fed by clinician   Order of Presentation: moderate thick barium coated soda cracker  Preparatory Phase: poor bolus control  Oral Phase: premature pharyngeal entry of barium with mastication  Bolus Location When  Swallow Initiated: posterior angle of ramus  Pharyngeal Phase: WFL  Rosenbeck's Penetration Aspiration Scale: 1 - no aspiration, contrast does not enter airway  Diagnostic Statement: see summary below     ESOPHAGEAL PHASE OF SWALLOW  patient reports symptoms of esophageal dysphagia  esophageal sweep performed during today's videofluoroscopic exam  please refer to radiologist's report for details     SWALLOW ASSESSMENT CLINICAL IMPRESSIONS AND RATIONALE  Diet Consistency Recommendations: oral diet, thin liquids (level 0), regular diet     Medication Administration Recommendations: per pt preference  Instrumental Assessment Recommendations: esophagram d/t reports of hiccups with eating     Assessment & Plan   CLINICAL IMPRESSIONS   Medical Diagnosis:      Treatment Diagnosis:     Impression/Assessment: Pt is a 62 year old male with minor swallowing complaints. Per VFSS results no significant dysphagia identified. No penetration or aspiration. Pt epiglottis completely inverts to protect the airway in a timely manner. NO stasis noted after the swallow. He had prespill of moderate thick barium that coated solid cracker with mastication, though no penetration or aspiration occurred. Recommend regular diet with thin liquids and to follow up with his physician about getting an esophagram to rule out any dysmotility that could be contributing to his 'hiccups' while swallowing.    PLAN OF CARE  Treatment Interventions:  NA      Recommended Referrals to Other Professionals: Esophagram  Education Assessment:        Risks and benefits of evaluation/treatment have been explained.   Patient/Family/caregiver agrees with Plan of Care.     Evaluation Time:           Present: Not applicable     Signing Clinician: Sharmila Burris, SLP        Referring Provider:  Shahab Swenson*      Initial Assessment  See Epic Evaluation-

## 2023-08-14 ENCOUNTER — OFFICE VISIT (OUTPATIENT)
Dept: NEUROLOGY | Facility: CLINIC | Age: 63
End: 2023-08-14
Attending: PSYCHIATRY & NEUROLOGY
Payer: COMMERCIAL

## 2023-08-14 DIAGNOSIS — G61.0 AIDP (ACUTE INFLAMMATORY DEMYELINATING POLYNEUROPATHY) (H): ICD-10-CM

## 2023-08-14 PROCEDURE — 95885 MUSC TST DONE W/NERV TST LIM: CPT | Mod: RT | Performed by: PSYCHIATRY & NEUROLOGY

## 2023-08-14 PROCEDURE — 95912 NRV CNDJ TEST 11-12 STUDIES: CPT | Performed by: PSYCHIATRY & NEUROLOGY

## 2023-08-14 NOTE — PROGRESS NOTES
[facility]  Electrodiagnostic Laboratory                                                                                                                               Department of Neurology                                                                                                           Test Date:  2023     Patient: Lito Whittaker : 1960 Physician: Jordy Beckman MD   Sex: Male AGE: 62 year Ref Phys: DRAKE Garcia DO   ID#: [id number]     Technician: Fellow      History and Examination:  62 year old male presenting with leg weakness since . He carries a diagnosis of GBS from that time. He also has diabetes and multiple low back surgeries. This study is being performed to investigate and characterize polyneuropathy.       Techniques:  Motor and sensory conduction studies were done with surface recording electrodes. EMG was done with a concentric needle electrode.      Results:     Nerve conduction studies:  1). Right Median-Dig 2 sensory was normal in distal latency, normal amplitude and mildly reduced conduction velocity.  2). Right Ulnar-Dig 5 sensory was normal in distal latency, mildly reduced amplitude and mildly reduced conduction velocity.  3). Right radial sensory was normal in distal latency, mildly reduced amplitude and normal conduction velocity.  4). Bilateral sural studies were both NR.  5). Right median (APB) motor study was mildly increased in distal latency, moderately reduced in amplitude and mildly reduced in conduction velocity.  6). Right ulnar (FDI) and (ADM) motor studies was normal in distal latency, amplitude and reduced at the level of above elbow (moderate-severe on the right and mild-moderate on the left).  7). Right Tibial (AHB) motor study was  mildly increased in latency, severely reduced in amplitude and mildly reduced in conduction velocity.   8). Left Tibial (AHB) motor study was mildly increased in latency, severely reduced in amplitude (only distal comparison was done).  9). Right Fibular (EDB) motor study was mildly increased in distal latency, moderately reduced in amplitude, and mildly reduced in conduction velocity below fibular head.  10). Right deep branch fibular (TA) motor study was normal in distal latency (below fibular head) and mildly prolonged distal latency in popliteal fossa.   11). Left Fibular (EDB) motor study showed a mildly increased latency and normal amplitude (only distal comparison was done).  12). Right ulnar F Wave latency was within normal limits.          Electromyography:     Needle evaluation of the right Vastus Lateralis muscle showed slightly increased motor unit amplitude.  The right Tibialis Anterior muscle showed increased insertional activity, moderately increased Fibs/PSW, slightly increased motor unit amplitude, slightly increased motor unit duration, moderately increased polyphasic potentials, and recruitment.  The right Gastrocnemius muscle showed increased insertional activity, moderately increased Fibs/PSW, slightly increased motor unit duration, moderately increased polyphasic potentials, and recruitment.          Interpretation:     This test was abnormal.     1). There is electrodiagnostic evidence of a probable length dependant, moderate, sensorimotor, axonal polyneuropathy.  2). There is electrodiagnostic evidence of a right moderate-severe ulnar neuropathy above the elbow.  3). There is electrodiagnostic evidence of a right mild-moderate median neuropathy at the wrist.  4). There was no electrodiagnostic evidence of a generalized demyelinating neuropathy.      Clinical correlation is recommended.     Vic Cutler MD  CNP Fellow           ___________________________  Jordy Beckman MD        Nerve  Conduction Studies  Motor Sites                       Latency Amplitude Neg. Amp Diff Segment Distance Velocity Neg. Dur Neg Area Diff Temperature Comment   Site (ms) Norm (mV) Norm %   cm m/s Norm ms %  C     Right Dp Branch Fibular (TA) Motor   Fib Head 5.4  < 6.0 2.1 -           12.2   31.4     Pop Fossa 7.2  < 5.7 2.0 - -4.8 Pop Fossa-Fib Head 9 50 - 10.9 -2.3 31.4     Left Fibular (EDB) Motor   Ankle 6.2  < 6.0 2.6  > 2.0   Ankle-EDB 8     5.8   30.6     Right Fibular (EDB) Motor   Ankle 6.1  < 6.0 0.93  > 2.0   Ankle-EDB 8     7.8   31.4     Bel Fib Head 13.9 - 0.65 - -30.1 Bel Fib Head-Ankle 27 35  > 38 8.1 -20.5 31.5     Pop Fossa 16.9 - 0.68 - 4.6 Pop Fossa-Bel Fib Head 9 30  > 38 7.8 -3.2 31.3     Right Median (APB) Motor   Wrist 5.2  < 4.4 3.8  > 5.0   Wrist-APB 8     5.4   32.7     Elbow 9.9 - 3.7  > 5.0 -2.6 Elbow-Wrist 21 45  > 48 5.7 -3.3 32.7     Left Tibial (AHB) Motor   Ankle 7.7  < 6.5 0.87  > 5.0   Ankle-AHB 8     -   30.2     Right Tibial (AHB) Motor   Ankle 7.8  < 6.5 0.32  > 5.0   Ankle-AHB 8     -   31.4     Knee 21.0 - 0.24 - -25.0 Knee-Ankle 43 33  > 38 - - 31.3     Right Ulnar (ADM) Motor   Wrist 3.5  < 3.5 7.3  > 5.0   Wrist-ADM 8     5.1   32.1     Bel Elbow 7.2 - 6.3 - -13.7 Bel Elbow-Wrist 18 49  > 48 5.5 -5.4 32.2     Abv Elbow 10.9 - 5.6 - -11.1 Abv Elbow-Bel Elbow 10 27  > 48 5.4 -4.4 32.3     Up Arm 12.6 - 4.7 - -16.1 Up Arm-Abv Elbow 10 59 - 5.1 -8.6 32.5     Right Ulnar (FDI) Motor   Wrist 5.1 - 10.9 -           3.8   32.9     Bel Elbow 8.8 - 10.9 - 0 Bel Elbow-Wrist 18 49  > 48 4.1 2.3 32.9     Abv Elbow 11.9 - 9.9 - -9.2 Abv Elbow-Bel Elbow 10 32  > 48 4.2 -7.3 32.8     Up Arm 14.0 - 8.6 - -13.1 Up Arm-Abv Elbow 10 48 - 4.0 -6.9 33        F-Wave Sites       Min F-Lat Max-Min F-Lat Mean F-Lat   Site (ms) ms ms   Right Ulnar F-Wave   Wrist 33.1 1.40 33.6      Sensory Sites                     Onset Lat Peak Lat Amp (O-P) Amp (P-P) Segment Distance Velocity Temperature Comment    Site ms ms  V Norm  V   cm m/s Norm  C     Right Median Sensory   Wrist-Dig II 3.5 4.4 10  > 10 13 Wrist-Dig II 14 40  > 48 32     Right Radial Sensory   Forearm-Wrist 2.1 2.7 11  > 15 3 Forearm-Wrist 10 48 - 33     Left Sural Sensory   Calf-Lat Mall NR NR NR  > 5 NR Calf-Lat Mall 14 NR  > 38 30     Right Sural Sensory   Calf-Lat Mall NR NR NR  > 5 NR Calf-Lat Mall 14 NR  > 38 30.3     Right Ulnar Sensory   Wrist-Dig V 2.8 3.9 7  > 8 11 Wrist-Dig V 12.5 45  > 48 32.6           Electromyography      Side Muscle Ins Act Fibs/PSW Fasc HF Amp Dur Poly Recrt Int Pat   Right Vastus Lat Nml None Nml 0 1+ Nml 0 Nml Nml   Right Tib Anterior Incr 2+ Nml 0 1+ 1+ 2+ Raisa Nml   Right Gastroc Incr 2+ Nml 0 Nml 1+ 2+ Raisa Nml            NCS Waveforms:     Motor                                                                                F-Wave        Sensory                                                     Ultrasound Images:              [facility]

## 2023-08-14 NOTE — PROGRESS NOTES
[facility]  Electrodiagnostic Laboratory                                                                                                                               Department of Neurology                                                                                                           Test Date:  2023     Patient: Lito Whittaker : 1960 Physician: Jordy Beckman MD   Sex: Male AGE: 62 year Ref Phys: DRAKE Garcia DO   ID#: [id number]     Technician: none      History and Examination:  62 year old male presenting with leg weakness since . He carries a diagnosis of GBS from that time. He also has diabetes and multiple low back surgeries. This study is being performed to investigate and characterize polyneuropathy.       Techniques:  Motor and sensory conduction studies were done with surface recording electrodes. EMG was done with a concentric needle electrode.      Results:  Nerve conduction studies:  Bilateral sural sensory responses are absent.   Right median-Dig 2 sensory response shows normal amplitude and moderately slowed conduction velocity.  Right Ulnar-Dig 5 sensory response shows mildly reduced amplitude and mildly slowed conduction velocity.  Right radial sensory response shows mildly reduced amplitude and normal conduction velocity.  Right median-APB motor response shows mildly increased in distal latency, moderately reduced in amplitude and mildly slowed in conduction velocity.  Right ulnar-FDI and ulnar-ADM motor response shows normal distal latency, normal amplitude and severely slowed CV across the elbow.   Right tibial-AH motor response shows mildly increased distal latency, severely reduced in amplitude and mildly slowed conduction velocity.  Left tibial-AH  motor response shows mildly increased latency and severely reduced amplitude.  Right fibular-EDB motor response shows mildly increased distal latency, moderately reduced in amplitude, and mildly slowed conduction velocity below fibular head.  Left fibular-ADB motor response shows mildly prolonged DL and normal amplitude.      Electromyography:  1. Fibrillation potentials and positive sharp waves were seen in the right TA and gastrocnemius muscles.   2. Large amplitude and/or long duration motor unit potentials (MUP) were seen in the right VL, TA and gastrocnemius muscles.   3. Rapidly firing MUPs with reduced recruitment were seen in the right TA and gastrocnemius muscles.      Interpretation:  This is an abnormal study. There is electrophysiologic evidence of (1) a moderately severe axonal probably length-dependent sensorimotor polyneuropathy, (2) a moderate right-sided ulnar neuropathy across the elbow and (3) a moderate right-sided median neuropathy across the wrist. In addition, note is made of (4) slight asymmetry of lower limb motor responses. This finding was not fully characterized on this study, but based upon the limited needle EMG study and his known history of multiple low back surgeries is most likely secondary to superimposed multilevel lumbosacral radiculopathies. There is no electrodiagnostic evidence of a generalized demyelinating polyneuropathy.      Clinical correlation is recommended.     Vic Cutler MD  CNP Fellow      I was present for all key portions of the NCS/EMG study. All data and waveforms personally reviewed. I agree with the results and interpretation as above.    Jordy Beckman MD  Department of Neurology          Nerve Conduction Studies  Motor Sites                       Latency Amplitude Neg. Amp Diff Segment Distance Velocity Neg. Dur Neg Area Diff Temperature Comment   Site (ms) Norm (mV) Norm %   cm m/s Norm ms %  C     Right Dp Branch Fibular (TA) Motor   Fib Head 5.4  < 6.0  2.1 -           12.2   31.4     Pop Fossa 7.2  < 5.7 2.0 - -4.8 Pop Fossa-Fib Head 9 50 - 10.9 -2.3 31.4     Left Fibular (EDB) Motor   Ankle 6.2  < 6.0 2.6  > 2.0   Ankle-EDB 8     5.8   30.6     Right Fibular (EDB) Motor   Ankle 6.1  < 6.0 0.93  > 2.0   Ankle-EDB 8     7.8   31.4     Bel Fib Head 13.9 - 0.65 - -30.1 Bel Fib Head-Ankle 27 35  > 38 8.1 -20.5 31.5     Pop Fossa 16.9 - 0.68 - 4.6 Pop Fossa-Bel Fib Head 9 30  > 38 7.8 -3.2 31.3     Right Median (APB) Motor   Wrist 5.2  < 4.4 3.8  > 5.0   Wrist-APB 8     5.4   32.7     Elbow 9.9 - 3.7  > 5.0 -2.6 Elbow-Wrist 21 45  > 48 5.7 -3.3 32.7     Left Tibial (AHB) Motor   Ankle 7.7  < 6.5 0.87  > 5.0   Ankle-AHB 8     -   30.2     Right Tibial (AHB) Motor   Ankle 7.8  < 6.5 0.32  > 5.0   Ankle-AHB 8     -   31.4     Knee 21.0 - 0.24 - -25.0 Knee-Ankle 43 33  > 38 - - 31.3     Right Ulnar (ADM) Motor   Wrist 3.5  < 3.5 7.3  > 5.0   Wrist-ADM 8     5.1   32.1     Bel Elbow 7.2 - 6.3 - -13.7 Bel Elbow-Wrist 18 49  > 48 5.5 -5.4 32.2     Abv Elbow 10.9 - 5.6 - -11.1 Abv Elbow-Bel Elbow 10 27  > 48 5.4 -4.4 32.3     Up Arm 12.6 - 4.7 - -16.1 Up Arm-Abv Elbow 10 59 - 5.1 -8.6 32.5     Right Ulnar (FDI) Motor   Wrist 5.1 - 10.9 -           3.8   32.9     Bel Elbow 8.8 - 10.9 - 0 Bel Elbow-Wrist 18 49  > 48 4.1 2.3 32.9     Abv Elbow 11.9 - 9.9 - -9.2 Abv Elbow-Bel Elbow 10 32  > 48 4.2 -7.3 32.8     Up Arm 14.0 - 8.6 - -13.1 Up Arm-Abv Elbow 10 48 - 4.0 -6.9 33        F-Wave Sites       Min F-Lat Max-Min F-Lat Mean F-Lat   Site (ms) ms ms   Right Ulnar F-Wave   Wrist 33.1 1.40 33.6      Sensory Sites                     Onset Lat Peak Lat Amp (O-P) Amp (P-P) Segment Distance Velocity Temperature Comment   Site ms ms  V Norm  V   cm m/s Norm  C     Right Median Sensory   Wrist-Dig II 3.5 4.4 10  > 10 13 Wrist-Dig II 14 40  > 48 32     Right Radial Sensory   Forearm-Wrist 2.1 2.7 11  > 15 3 Forearm-Wrist 10 48 - 33     Left Sural Sensory   Calf-Lat Mall NR NR NR  > 5 NR  Calf-Lat Mall 14 NR  > 38 30     Right Sural Sensory   Calf-Lat Mall NR NR NR  > 5 NR Calf-Lat Mall 14 NR  > 38 30.3     Right Ulnar Sensory   Wrist-Dig V 2.8 3.9 7  > 8 11 Wrist-Dig V 12.5 45  > 48 32.6           Electromyography      Side Muscle Ins Act Fibs/PSW Fasc HF Amp Dur Poly Recrt Int Pat   Right Vastus Lat Nml None Nml 0 1+ Nml 0 Nml Nml   Right Tib Anterior Incr 2+ Nml 0 1+ 1+ 2+ Raisa Nml   Right Gastroc Incr 2+ Nml 0 Nml 1+ 2+ Raisa Nml            NCS Waveforms:     Motor                                                                                F-Wave        Sensory                                                     Ultrasound Images:

## 2023-08-14 NOTE — LETTER
2023       RE: Lito Malone  [address] [facility] [address]     Dear Colleague,    Thank you for referring your patient, Lito Malone, to the [facility] [place] at [facility]. Please see a copy of my visit note below.                                                                                                                                                                                                              [facility]  Electrodiagnostic Laboratory                                                                                                                               Department of Neurology                                                                                                           Test Date:  2023     Patient: Lito Whittaker : 1960 Physician: Jordy Beckman MD   Sex: Male AGE: 62 year Ref Phys: DRAKE Garcia DO   ID#: [id number]     Technician: none      History and Examination:  62 year old male presenting with leg weakness since . He carries a diagnosis of GBS from that time. He also has diabetes and multiple low back surgeries. This study is being performed to investigate and characterize polyneuropathy.       Techniques:  Motor and sensory conduction studies were done with surface recording electrodes. EMG was done with a concentric needle electrode.      Results:  Nerve conduction studies:  Bilateral sural sensory responses are absent.   Right median-Dig 2 sensory response shows normal amplitude and moderately slowed conduction velocity.  Right Ulnar-Dig 5 sensory response shows mildly reduced amplitude and mildly slowed conduction velocity.  Right radial sensory response shows mildly reduced amplitude and normal conduction velocity.  Right median-APB motor response shows mildly increased in distal latency, moderately reduced in amplitude and mildly  slowed in conduction velocity.  Right ulnar-FDI and ulnar-ADM motor response shows normal distal latency, normal amplitude and severely slowed CV across the elbow.   Right tibial-AH motor response shows mildly increased distal latency, severely reduced in amplitude and mildly slowed conduction velocity.  Left tibial-AH motor response shows mildly increased latency and severely reduced amplitude.  Right fibular-EDB motor response shows mildly increased distal latency, moderately reduced in amplitude, and mildly slowed conduction velocity below fibular head.  Left fibular-ADB motor response shows mildly prolonged DL and normal amplitude.      Electromyography:  1. Fibrillation potentials and positive sharp waves were seen in the right TA and gastrocnemius muscles.   2. Large amplitude and/or long duration motor unit potentials (MUP) were seen in the right VL, TA and gastrocnemius muscles.   3. Rapidly firing MUPs with reduced recruitment were seen in the right TA and gastrocnemius muscles.      Interpretation:  This is an abnormal study. There is electrophysiologic evidence of (1) a moderately severe axonal probably length-dependent sensorimotor polyneuropathy, (2) a moderate right-sided ulnar neuropathy across the elbow and (3) a moderate right-sided median neuropathy across the wrist. In addition, note is made of (4) slight asymmetry of lower limb motor responses. This finding was not fully characterized on this study, but based upon the limited needle EMG study and his known history of multiple low back surgeries is most likely secondary to superimposed multilevel lumbosacral radiculopathies. There is no electrodiagnostic evidence of a generalized demyelinating polyneuropathy.      Clinical correlation is recommended.     Vic Cutler MD  CNP Fellow      I was present for all key portions of the NCS/EMG study. All data and waveforms personally reviewed. I agree with the results and interpretation as  above.    Jordy Beckman MD  Department of Neurology          Nerve Conduction Studies  Motor Sites                       Latency Amplitude Neg. Amp Diff Segment Distance Velocity Neg. Dur Neg Area Diff Temperature Comment   Site (ms) Norm (mV) Norm %   cm m/s Norm ms %  C     Right Dp Branch Fibular (TA) Motor   Fib Head 5.4  < 6.0 2.1 -           12.2   31.4     Pop Fossa 7.2  < 5.7 2.0 - -4.8 Pop Fossa-Fib Head 9 50 - 10.9 -2.3 31.4     Left Fibular (EDB) Motor   Ankle 6.2  < 6.0 2.6  > 2.0   Ankle-EDB 8     5.8   30.6     Right Fibular (EDB) Motor   Ankle 6.1  < 6.0 0.93  > 2.0   Ankle-EDB 8     7.8   31.4     Bel Fib Head 13.9 - 0.65 - -30.1 Bel Fib Head-Ankle 27 35  > 38 8.1 -20.5 31.5     Pop Fossa 16.9 - 0.68 - 4.6 Pop Fossa-Bel Fib Head 9 30  > 38 7.8 -3.2 31.3     Right Median (APB) Motor   Wrist 5.2  < 4.4 3.8  > 5.0   Wrist-APB 8     5.4   32.7     Elbow 9.9 - 3.7  > 5.0 -2.6 Elbow-Wrist 21 45  > 48 5.7 -3.3 32.7     Left Tibial (AHB) Motor   Ankle 7.7  < 6.5 0.87  > 5.0   Ankle-AHB 8     -   30.2     Right Tibial (AHB) Motor   Ankle 7.8  < 6.5 0.32  > 5.0   Ankle-AHB 8     -   31.4     Knee 21.0 - 0.24 - -25.0 Knee-Ankle 43 33  > 38 - - 31.3     Right Ulnar (ADM) Motor   Wrist 3.5  < 3.5 7.3  > 5.0   Wrist-ADM 8     5.1   32.1     Bel Elbow 7.2 - 6.3 - -13.7 Bel Elbow-Wrist 18 49  > 48 5.5 -5.4 32.2     Abv Elbow 10.9 - 5.6 - -11.1 Abv Elbow-Bel Elbow 10 27  > 48 5.4 -4.4 32.3     Up Arm 12.6 - 4.7 - -16.1 Up Arm-Abv Elbow 10 59 - 5.1 -8.6 32.5     Right Ulnar (FDI) Motor   Wrist 5.1 - 10.9 -           3.8   32.9     Bel Elbow 8.8 - 10.9 - 0 Bel Elbow-Wrist 18 49  > 48 4.1 2.3 32.9     Abv Elbow 11.9 - 9.9 - -9.2 Abv Elbow-Bel Elbow 10 32  > 48 4.2 -7.3 32.8     Up Arm 14.0 - 8.6 - -13.1 Up Arm-Abv Elbow 10 48 - 4.0 -6.9 33        F-Wave Sites       Min F-Lat Max-Min F-Lat Mean F-Lat   Site (ms) ms ms   Right Ulnar F-Wave   Wrist 33.1 1.40 33.6      Sensory Sites                     Onset Lat Peak Lat  Amp (O-P) Amp (P-P) Segment Distance Velocity Temperature Comment   Site ms ms  V Norm  V   cm m/s Norm  C     Right Median Sensory   Wrist-Dig II 3.5 4.4 10  > 10 13 Wrist-Dig II 14 40  > 48 32     Right Radial Sensory   Forearm-Wrist 2.1 2.7 11  > 15 3 Forearm-Wrist 10 48 - 33     Left Sural Sensory   Calf-Lat Mall NR NR NR  > 5 NR Calf-Lat Mall 14 NR  > 38 30     Right Sural Sensory   Calf-Lat Mall NR NR NR  > 5 NR Calf-Lat Mall 14 NR  > 38 30.3     Right Ulnar Sensory   Wrist-Dig V 2.8 3.9 7  > 8 11 Wrist-Dig V 12.5 45  > 48 32.6           Electromyography      Side Muscle Ins Act Fibs/PSW Fasc HF Amp Dur Poly Recrt Int Pat   Right Vastus Lat Nml None Nml 0 1+ Nml 0 Nml Nml   Right Tib Anterior Incr 2+ Nml 0 1+ 1+ 2+ Raisa Nml   Right Gastroc Incr 2+ Nml 0 Nml 1+ 2+ Raisa Nml            NCS Waveforms:     Motor                                                                                F-Wave        Sensory                                                     Ultrasound Images:      Again, thank you for allowing me to participate in the care of your patient.      Sincerely,    Jordy Beckman MD

## 2023-08-15 DIAGNOSIS — E11.65 INADEQUATELY CONTROLLED DIABETES MELLITUS (H): Primary | ICD-10-CM

## 2023-08-15 DIAGNOSIS — G89.4 CHRONIC PAIN SYNDROME: ICD-10-CM

## 2023-08-15 RX ORDER — LOSARTAN POTASSIUM 25 MG/1
25 TABLET ORAL DAILY
Qty: 90 TABLET | Refills: 3 | Status: SHIPPED | OUTPATIENT
Start: 2023-08-15 | End: 2023-12-08

## 2023-08-15 RX ORDER — CYCLOBENZAPRINE HCL 10 MG
10 TABLET ORAL 2 TIMES DAILY PRN
Qty: 90 TABLET | Refills: 1 | Status: SHIPPED | OUTPATIENT
Start: 2023-08-15 | End: 2023-08-17

## 2023-08-15 RX ORDER — GABAPENTIN 100 MG/1
100 CAPSULE ORAL 3 TIMES DAILY
Qty: 270 CAPSULE | Refills: 0 | Status: SHIPPED | OUTPATIENT
Start: 2023-08-15 | End: 2023-11-24

## 2023-08-15 NOTE — TELEPHONE ENCOUNTER
Medication Question or Refill    Contacts         Type Contact Phone/Fax    08/15/2023 10:48 AM CDT Phone (Incoming) Lito Malone (Self) [phone] (H)            What medication are you calling about (include dose and sig)?: gabapentin (NEURONTIN) 100 MG capsule,metFORMIN (GLUCOPHAGE) 1000 MG tablet,losartan (COZAAR) 25 MG tablet,empagliflozin (JARDIANCE) 25 MG TABS tablet,cyclobenzaprine (FLEXERIL) 10 MG tablet     Hydrocodone 5-325MG (Not on med list can not pend medication)    Preferred Pharmacy:  [id number] - [facility] [address] [address] [facility]  [address] [place] [id number]  Phone: [phone] Fax: [phone]      Controlled Substance Agreement on file:   CSA -- Patient Level:    CSA: None found at the patient level.       Who prescribed the medication?: PCP    Do you need a refill? Yes, pt stated that he is out of the Cyclobenzaprine and Hydrocodone. Other medications will be out before pt is able to get in to see PCP.     When did you use the medication last? Daily     Patient offered an appointment? Yes: Pt scheduled for 09/08/23    Do you have any questions or concerns?  No    Okay to leave a detailed message?: Yes at Home number on file [phone]

## 2023-08-15 NOTE — TELEPHONE ENCOUNTER
FYI - Status Update    Update: Called and spoke with patient. Patient stated that he understand completely that he needs to be seen first prior to having controlled substance prescribed. Pt stated that he has an appointment in September and is experiencing a flare up of severe pain. Pt wanting to get in sooner. Please advise     Does caller want a call/response back: Yes     Okay to leave a detailed message?: Yes at Home number on file [phone] (home)

## 2023-08-17 ENCOUNTER — LAB (OUTPATIENT)
Dept: FAMILY MEDICINE | Facility: CLINIC | Age: 63
End: 2023-08-17

## 2023-08-17 ENCOUNTER — OFFICE VISIT (OUTPATIENT)
Dept: FAMILY MEDICINE | Facility: CLINIC | Age: 63
End: 2023-08-17
Payer: COMMERCIAL

## 2023-08-17 VITALS
TEMPERATURE: 97 F | SYSTOLIC BLOOD PRESSURE: 138 MMHG | OXYGEN SATURATION: 97 % | WEIGHT: 211 LBS | DIASTOLIC BLOOD PRESSURE: 79 MMHG | BODY MASS INDEX: 30.28 KG/M2 | HEART RATE: 107 BPM | RESPIRATION RATE: 18 BRPM

## 2023-08-17 DIAGNOSIS — M79.18 MYOFASCIAL PAIN: ICD-10-CM

## 2023-08-17 DIAGNOSIS — G61.0 AIDP (ACUTE INFLAMMATORY DEMYELINATING POLYNEUROPATHY) (H): ICD-10-CM

## 2023-08-17 DIAGNOSIS — M54.2 CERVICAL SPINE PAIN: ICD-10-CM

## 2023-08-17 DIAGNOSIS — E11.65 INADEQUATELY CONTROLLED DIABETES MELLITUS (H): ICD-10-CM

## 2023-08-17 DIAGNOSIS — Z11.59 ENCOUNTER FOR HEPATITIS C SCREENING TEST FOR LOW RISK PATIENT: ICD-10-CM

## 2023-08-17 DIAGNOSIS — I73.9 PVD (PERIPHERAL VASCULAR DISEASE) (H): ICD-10-CM

## 2023-08-17 DIAGNOSIS — Z12.11 SCREEN FOR COLON CANCER: ICD-10-CM

## 2023-08-17 DIAGNOSIS — E11.3211 MILD NONPROLIFERATIVE DIABETIC RETINOPATHY OF RIGHT EYE WITH MACULAR EDEMA ASSOCIATED WITH TYPE 2 DIABETES MELLITUS (H): ICD-10-CM

## 2023-08-17 DIAGNOSIS — E11.40 TYPE 2 DIABETES MELLITUS WITH DIABETIC NEUROPATHY, WITHOUT LONG-TERM CURRENT USE OF INSULIN (H): Primary | ICD-10-CM

## 2023-08-17 DIAGNOSIS — Z11.4 SCREENING FOR HIV (HUMAN IMMUNODEFICIENCY VIRUS): ICD-10-CM

## 2023-08-17 PROBLEM — I10 HTN (HYPERTENSION): Status: ACTIVE | Noted: 2022-08-28

## 2023-08-17 PROBLEM — E11.9 DM2 (DIABETES MELLITUS, TYPE 2) (H): Status: ACTIVE | Noted: 2022-08-28

## 2023-08-17 PROBLEM — E78.5 HYPERLIPIDEMIA: Status: ACTIVE | Noted: 2022-08-28

## 2023-08-17 PROBLEM — I21.3 STEMI (ST ELEVATION MYOCARDIAL INFARCTION) (H): Status: ACTIVE | Noted: 2022-08-28

## 2023-08-17 LAB
CREAT UR-MCNC: 51.7 MG/DL
HBA1C MFR BLD: 8.8 % (ref 0–5.6)
HCV AB SERPL QL IA: NONREACTIVE
HIV 1+2 AB+HIV1 P24 AG SERPL QL IA: NONREACTIVE
MICROALBUMIN UR-MCNC: 616 MG/L
MICROALBUMIN/CREAT UR: 1191.49 MG/G CR (ref 0–17)

## 2023-08-17 PROCEDURE — 83036 HEMOGLOBIN GLYCOSYLATED A1C: CPT | Performed by: PHYSICIAN ASSISTANT

## 2023-08-17 PROCEDURE — 86803 HEPATITIS C AB TEST: CPT | Performed by: PHYSICIAN ASSISTANT

## 2023-08-17 PROCEDURE — 36415 COLL VENOUS BLD VENIPUNCTURE: CPT | Performed by: PHYSICIAN ASSISTANT

## 2023-08-17 PROCEDURE — 82043 UR ALBUMIN QUANTITATIVE: CPT | Performed by: PHYSICIAN ASSISTANT

## 2023-08-17 PROCEDURE — 99214 OFFICE O/P EST MOD 30 MIN: CPT | Performed by: PHYSICIAN ASSISTANT

## 2023-08-17 PROCEDURE — 99207 PR FOOT EXAM NO CHARGE: CPT | Performed by: PHYSICIAN ASSISTANT

## 2023-08-17 PROCEDURE — 87389 HIV-1 AG W/HIV-1&-2 AB AG IA: CPT | Performed by: PHYSICIAN ASSISTANT

## 2023-08-17 PROCEDURE — 82570 ASSAY OF URINE CREATININE: CPT | Performed by: PHYSICIAN ASSISTANT

## 2023-08-17 RX ORDER — DESONIDE 0.5 MG/G
CREAM TOPICAL
COMMUNITY
Start: 2023-07-07 | End: 2024-08-26

## 2023-08-17 RX ORDER — CLOPIDOGREL BISULFATE 75 MG/1
75 TABLET ORAL DAILY
COMMUNITY
End: 2024-01-16

## 2023-08-17 RX ORDER — CALCIPOTRIENE 50 UG/G
CREAM TOPICAL 2 TIMES DAILY
COMMUNITY
Start: 2023-07-06 | End: 2024-08-26

## 2023-08-17 RX ORDER — CLOBETASOL PROPIONATE 0.5 MG/G
CREAM TOPICAL 2 TIMES DAILY
COMMUNITY
Start: 2023-07-07 | End: 2024-08-26

## 2023-08-17 RX ORDER — NITROGLYCERIN 0.4 MG/1
0.4 TABLET SUBLINGUAL EVERY 5 MIN PRN
COMMUNITY
Start: 2022-09-07 | End: 2024-08-26

## 2023-08-17 RX ORDER — BACLOFEN 10 MG/1
10 TABLET ORAL 3 TIMES DAILY PRN
Qty: 60 TABLET | Refills: 3 | Status: SHIPPED | OUTPATIENT
Start: 2023-08-17 | End: 2024-08-26

## 2023-08-17 RX ORDER — HYDROCODONE BITARTRATE AND ACETAMINOPHEN 5; 325 MG/1; MG/1
1-2 TABLET ORAL EVERY 6 HOURS PRN
Qty: 40 TABLET | Refills: 0 | Status: SHIPPED | OUTPATIENT
Start: 2023-08-17 | End: 2023-12-08

## 2023-08-17 RX ORDER — HYDROCODONE BITARTRATE AND ACETAMINOPHEN 5; 325 MG/1; MG/1
1-2 TABLET ORAL EVERY 4 HOURS PRN
COMMUNITY
Start: 2023-05-30 | End: 2023-08-17

## 2023-08-17 ASSESSMENT — PAIN SCALES - GENERAL: PAINLEVEL: WORST PAIN (10)

## 2023-08-17 NOTE — PROGRESS NOTES
Assessment & Plan     (E11.40) Type 2 diabetes mellitus with diabetic neuropathy, without long-term current use of insulin (H)  (primary encounter diagnosis)  Comment: Chronic uncontrolled  Plan: FOOT EXAM, Hemoglobin A1c, Albumin Random Urine        Quantitative with Creat Ratio        Recheck A1c today, patient's BMP and lipids are up-to-date.  Adjust dose of Ozempic based on today's results, patient advised to check blood sugars more frequently    (M79.18) Myofascial pain  Comment: Chronic  Plan: baclofen (LIORESAL) 10 MG tablet,         HYDROcodone-acetaminophen (NORCO) 5-325 MG         tablet        Referral sent to [facility] pain clinic to establish care.    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 associated with type 2 diabetes mellitus (H)  Comment:   Plan: Currently managed by neuro-ophthalmology at the [facility]    (G61.0) AIDP (acute inflammatory demyelinating polyneuropathy) (H)  Comment: Stable  Plan: Currently managed by neurology, recently had EMG performed yesterday, follow-up appointment pending    (E11.65) Inadequately controlled diabetes mellitus (H)  Comment: Chronic  Plan: Consider increasing dose of Ozempic based on today's A1c    (Z11.59) Encounter for hepatitis C screening test for low risk patient  Comment:   Plan: Hepatitis C antibody        CDC recommended once lifetime screening performed today    (Z11.4) Screening for HIV (human immunodeficiency virus)  Comment:   Plan: HIV Antigen Antibody Combo              CDC recommended once  "lifetime screening performed today    (Z12.11) Screen for colon cancer  Comment: Due  Plan: Cologuard last performed, reordered today    (I73.9) PVD (peripheral vascular disease) (H)  Comment: Stable  Plan: Managed by interventional radiology and continues on dual antiplatelet therapy             Nicotine/Tobacco Cessation:  He reports that he has been smoking cigarettes. He has been smoking an average of 1 pack per day. He has quit using smokeless tobacco.  Nicotine/Tobacco Cessation Plan:   Self help information given to patient      BMI:   Estimated body mass index is 30.28 kg/m  as calculated from the following:    Height as of 11/2/22: 1.778 m (5' 10\").    Weight as of this encounter: 95.7 kg (211 lb).           Ike Eason PA-C  [facility]    West Morse is a 62 year old, presenting for the following health issues:  Neck Pain, Derm Problem (Psoriasis? Hand problem ), and Refill Request        8/17/2023    11:44 AM   Additional Questions   Roomed by Dee Dee       History of Present Illness       Reason for visit:  Neck pain    He eats 2-3 servings of fruits and vegetables daily.He consumes 0 sweetened beverage(s) daily.He exercises with enough effort to increase his heart rate 10 to 19 minutes per day.  He exercises with enough effort to increase his heart rate 4 days per week.   He is taking medications regularly.     Patient presents today to establish care at this clinic.  He was previously a patient of mine at my last clinic.  Controlled substance agreement was on file at that time.    Patient with a history of chronic cervical spine pain status post multiple fusions.  Most recently followed by neurology for cervical radicular pain related to acute inflammatory demyelinating polyneuropathy, stemming from COVID-19 vaccination.  Patient previously was a resident of [place] 3 years prior, experienced limited paralysis after COVID-19 vaccination, underwent 11 MRIs over " the course of 3 weeks, multiple spinal taps and was essentially treated with IVIG for 5 days with near complete recovery.  He continues to have diplopia as well as diabetic neuropathy and is managed by neuro-ophthalmology at the [facility].    Patient is also type II diabetic, blood sugars are being checked infrequently, using metformin and Ozempic with improving blood sugars.  Patient denies any medication side effects, he does take gabapentin for neuropathy symptoms in his feet that stemming from his previous neck injury.  Patient also uses occasional hydrocodone for neuropathic pain chronic cervical pain.  Last refill was in May 2023 with a number of 40.    Patient also with peripheral vascular disease with previous stenting performed by Dr. Sidney Schilling at [facility] interventional radiology.  Blood flow is improving and no longer any nonhealing wounds, he continues on dual antiplatelet therapy.  No increased bruising or bleeding reported by the patient    Patient recently has seen dermatology for evaluation of bilateral hand rash, initially found to be psoriasis and has been treating with topical clobetasol, they attempted to start Cosentyx but this is not yet approved by his insurance.              Review of Systems         Objective    /79   Pulse 107   Temp 97  F (36.1  C) (Temporal)   Resp 18   Wt 95.7 kg (211 lb)   SpO2 97%   BMI 30.28 kg/m    Body mass index is 30.28 kg/m .  Physical Exam   GENERAL: healthy, alert and no distress, uncomfortable due to pain  NECK: no adenopathy, no asymmetry, masses, surgical scars present, decreased range of motion in all directions, guarded motion  RESP: lungs clear to auscultation - no rales, rhonchi or wheezes  CV: regular rate and rhythm, normal S1 S2, no S3 or S4, no murmur, click or rub, no peripheral edema and peripheral pulses strong  ABDOMEN: soft, nontender, no hepatosplenomegaly, no masses and bowel sounds normal  MS: no gross  musculoskeletal defects noted, no edema, decreased strength in bilateral shoulders in all directions  Diabetic foot exam: normal DP and PT pulses, no trophic changes or ulcerative lesions, and normal sensory exam

## 2023-10-22 ENCOUNTER — HEALTH MAINTENANCE LETTER (OUTPATIENT)
Age: 63
End: 2023-10-22

## 2023-11-06 ENCOUNTER — TRANSFERRED RECORDS (OUTPATIENT)
Dept: HEALTH INFORMATION MANAGEMENT | Facility: CLINIC | Age: 63
End: 2023-11-06
Payer: MEDICARE

## 2023-11-16 ENCOUNTER — TRANSFERRED RECORDS (OUTPATIENT)
Dept: HEALTH INFORMATION MANAGEMENT | Facility: CLINIC | Age: 63
End: 2023-11-16
Payer: MEDICARE

## 2023-11-22 DIAGNOSIS — G89.4 CHRONIC PAIN SYNDROME: ICD-10-CM

## 2023-11-24 RX ORDER — GABAPENTIN 100 MG/1
100 CAPSULE ORAL 3 TIMES DAILY
Qty: 270 CAPSULE | Refills: 0 | Status: SHIPPED | OUTPATIENT
Start: 2023-11-24 | End: 2024-02-20

## 2023-12-08 ENCOUNTER — OFFICE VISIT (OUTPATIENT)
Dept: FAMILY MEDICINE | Facility: CLINIC | Age: 63
End: 2023-12-08
Payer: MEDICARE

## 2023-12-08 VITALS
WEIGHT: 195 LBS | DIASTOLIC BLOOD PRESSURE: 78 MMHG | HEART RATE: 68 BPM | BODY MASS INDEX: 27.92 KG/M2 | HEIGHT: 70 IN | RESPIRATION RATE: 16 BRPM | OXYGEN SATURATION: 96 % | TEMPERATURE: 97.6 F | SYSTOLIC BLOOD PRESSURE: 138 MMHG

## 2023-12-08 DIAGNOSIS — E11.3211 MILD NONPROLIFERATIVE DIABETIC RETINOPATHY OF RIGHT EYE WITH MACULAR EDEMA ASSOCIATED WITH TYPE 2 DIABETES MELLITUS (H): ICD-10-CM

## 2023-12-08 DIAGNOSIS — E11.65 INADEQUATELY CONTROLLED DIABETES MELLITUS (H): ICD-10-CM

## 2023-12-08 DIAGNOSIS — E11.21 DIABETIC NEPHROPATHY ASSOCIATED WITH TYPE 2 DIABETES MELLITUS (H): ICD-10-CM

## 2023-12-08 DIAGNOSIS — E11.40 TYPE 2 DIABETES MELLITUS WITH DIABETIC NEUROPATHY, WITHOUT LONG-TERM CURRENT USE OF INSULIN (H): Primary | ICD-10-CM

## 2023-12-08 DIAGNOSIS — G89.4 CHRONIC PAIN SYNDROME: ICD-10-CM

## 2023-12-08 DIAGNOSIS — M79.18 MYOFASCIAL PAIN: ICD-10-CM

## 2023-12-08 DIAGNOSIS — I10 PRIMARY HYPERTENSION: ICD-10-CM

## 2023-12-08 LAB — HBA1C MFR BLD: 7.4 % (ref 0–5.6)

## 2023-12-08 PROCEDURE — 83036 HEMOGLOBIN GLYCOSYLATED A1C: CPT | Performed by: PHYSICIAN ASSISTANT

## 2023-12-08 PROCEDURE — 36415 COLL VENOUS BLD VENIPUNCTURE: CPT | Performed by: PHYSICIAN ASSISTANT

## 2023-12-08 PROCEDURE — 99214 OFFICE O/P EST MOD 30 MIN: CPT | Performed by: PHYSICIAN ASSISTANT

## 2023-12-08 RX ORDER — RESPIRATORY SYNCYTIAL VIRUS VACCINE 120MCG/0.5
0.5 KIT INTRAMUSCULAR ONCE
Qty: 1 EACH | Refills: 0 | Status: CANCELLED | OUTPATIENT
Start: 2023-12-08 | End: 2023-12-08

## 2023-12-08 RX ORDER — LOSARTAN POTASSIUM 50 MG/1
50 TABLET ORAL DAILY
Qty: 90 TABLET | Refills: 3 | Status: SHIPPED | OUTPATIENT
Start: 2023-12-08 | End: 2024-12-02

## 2023-12-08 RX ORDER — TIZANIDINE 2 MG/1
2 TABLET ORAL 3 TIMES DAILY
Qty: 90 TABLET | Refills: 1 | Status: SHIPPED | OUTPATIENT
Start: 2023-12-08 | End: 2024-08-26

## 2023-12-08 RX ORDER — SEMAGLUTIDE 1.34 MG/ML
1 INJECTION, SOLUTION SUBCUTANEOUS
COMMUNITY
Start: 2023-12-08 | End: 2024-08-26

## 2023-12-08 RX ORDER — HYDROCODONE BITARTRATE AND ACETAMINOPHEN 5; 325 MG/1; MG/1
1-2 TABLET ORAL EVERY 6 HOURS PRN
Qty: 40 TABLET | Refills: 0 | Status: SHIPPED | OUTPATIENT
Start: 2023-12-08 | End: 2024-08-26

## 2023-12-08 RX ORDER — CARVEDILOL 6.25 MG/1
6.25 TABLET ORAL 2 TIMES DAILY WITH MEALS
Qty: 180 TABLET | Refills: 3 | Status: SHIPPED | OUTPATIENT
Start: 2023-12-08 | End: 2024-03-06

## 2023-12-08 NOTE — PROGRESS NOTES
"  Assessment & Plan     (E11.40) Type 2 diabetes mellitus with diabetic neuropathy, without long-term current use of insulin (H)  (primary encounter diagnosis)  Comment:   Plan: Hemoglobin A1c        Patient's neuropathy is improving with lower blood sugars, he was advised that he can increase gabapentin to 400 mg nightly call for refills as needed    (E11.65) Inadequately controlled diabetes mellitus (H)  Comment:   Plan: losartan (COZAAR) 50 MG tablet        A1c greatly improved to 7.4% today, no medication changes made, patient congratulated on weight loss and dietary changes.  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    (M79.18) Myofascial pain  Comment:   Plan: HYDROcodone-acetaminophen (NORCO) 5-325 MG         tablet        Chronic cervical and lumbar spine pain, rare use of hydrocodone, controlled substance agreement signed today, no concerns for patient's use    (G89.4) Chronic pain syndrome  Comment:   Plan: tiZANidine (ZANAFLEX) 2 MG tablet        Patient tends to use tizanidine and gabapentin for pain, using hydrocodone for severe pain when having trouble sleeping.    (I10) Primary hypertension  Comment:   Plan: carvedilol (COREG) 6.25 MG tablet        BP improved today on recheck, dose of losatran increased for better BP control and for renal protection             BMI:   Estimated body mass index is 27.92 kg/m  as calculated from the following:    Height as of this encounter: 1.78 m (5' 10.08\").    Weight as of this encounter: 88.5 kg (195 " "lb).           GURINDER [facility]    West Morse is a 63 year old, presenting for the following health issues:  Diabetes and Recheck Medication      12/8/2023     9:47 AM   Additional Questions   Roomed by Gayatri   Accompanied by Gayatri       History of Present Illness       Diabetes:   He presents for follow up of diabetes.  He is checking home blood glucose a few times a month.   He checks blood glucose before meals.  Blood glucose is never over 200 and never under 70. He is aware of hypoglycemia symptoms including none.   He is concerned about other.   He is having numbness in feet and burning in feet.            He eats 2-3 servings of fruits and vegetables daily.He consumes 0 sweetened beverage(s) daily.He exercises with enough effort to increase his heart rate 9 or less minutes per day.  He exercises with enough effort to increase his heart rate 3 or less days per week.   He is taking medications regularly.     Patient is also type II diabetic, blood sugars are being checked infrequently, using metformin and Ozempic with improving blood sugars. Doing a great job limiting breads and carbs since last being seen.  Patient denies any medication side effects, he does take gabapentin for neuropathy symptoms in his feet that stemming from his previous back injury.  Patient also uses occasional hydrocodone for neuropathic pain chronic cervical pain.  Last refill was in August 2023 with a number of 40.               Review of Systems         Objective    BP (!) 153/93 (BP Location: Right arm, Patient Position: Sitting, Cuff Size: Adult Large)   Pulse 68   Temp 97.6  F (36.4  C) (Temporal)   Resp 16   Ht 1.78 m (5' 10.08\")   Wt 88.5 kg (195 lb)   SpO2 96%   BMI 27.92 kg/m    Body mass index is 27.92 kg/m .  Physical Exam   GENERAL: healthy, alert and no distress  EYES: Eyes grossly normal to inspection, EOM intact and conjunctivae normal  RESP: breathing " comfortably on room air  PSYCH: mentation appears normal, affect normal/bright

## 2023-12-08 NOTE — LETTER
Opioid / Opioid Plus Controlled Substance Agreement    This is an agreement between you and your provider about the safe and appropriate use of controlled substance/opioids prescribed by your care team. Controlled substances are medicines that can cause physical and mental dependence (abuse).    There are strict laws about having and using these medicines. We here at [facility] are committing to working with you in your efforts to get better. To support you in this work, we ll help you schedule regular office appointments for medicine refills. If we must cancel or change your appointment for any reason, we ll make sure you have enough medicine to last until your next appointment.     As a Provider, I will:  Listen carefully to your concerns and treat you with respect.   Recommend a treatment plan that I believe is in your best interest. This plan may involve therapies other than opioid pain medication.   Talk with you often about the possible benefits, and the risk of harm of any medicine that we prescribe for you.   Provide a plan on how to taper (discontinue or go off) using this medicine if the decision is made to stop its use.    As a Patient, I understand that opioid(s):   Are a controlled substance prescribed by my care team to help me function or work and manage my condition(s).   Are strong medicines and can cause serious side effects such as:  Drowsiness, which can seriously affect my driving ability  A lower breathing rate, enough to cause death  Harm to my thinking ability   Depression   Abuse of and addiction to this medicine  Need to be taken exactly as prescribed. Combining opioids with certain medicines or chemicals (such as illegal drugs, sedatives, sleeping pills, and benzodiazepines) can be dangerous or even fatal. If I stop opioids suddenly, I may have severe withdrawal symptoms.  Do not work for all types of pain nor for all patients. If they re not helpful, I may be asked to stop  them.        The risks, benefits and side effects of these medicine(s) were explained to me. I agree that:  I will take part in other treatments as advised by my care team. This may be psychiatry or counseling, physical therapy, behavioral therapy, group treatment or a referral to a specialist.     I will keep all my appointments. I understand that this is part of the monitoring of opioids. My care team may require an office visit for EVERY opioid/controlled substance refill. If I miss appointments or don t follow instructions, my care team may stop my medicine.    I will take my medicines as prescribed. I will not change the dose or schedule unless my care team tells me to. There will be no refills if I run out early.     I may be asked to come to the clinic and complete a urine drug test or complete a pill count at any time. If I don t give a urine sample or participate in a pill count, the care team may stop my medicine.    I will only receive prescriptions from this clinic for chronic pain. If I am treated by another provider for acute pain issues, I will tell them that I am taking opioid pain medication for chronic pain and that I have a treatment agreement with this provider. I will inform my [facility] care team within one business day if I am given a prescription for any pain medication by another healthcare provider. My [facility] care team can contact other providers and pharmacists about my use of any medicines.    It is up to me to make sure that I don t run out of my medicines on weekends or holidays. If my care team is willing to refill my opioid prescription without a visit, I must request refills only during office hours. Refills may take up to 3 business days to process. I will use one pharmacy to fill all my opioid and other controlled substance prescriptions. I will notify the clinic about any changes to my insurance or medication availability.    I am responsible for my  prescriptions. If the medicine/prescription is lost, stolen or destroyed, it will not be replaced. I also agree not to share controlled substance medicines with anyone.    I am aware I should not use any illegal or recreational drugs. I agree not to drink alcohol unless my care team says I can.       If I enroll in the [place] Medical Cannabis program, I will tell my care team prior to my next refill.     I will tell my care team right away if I become pregnant, have a new medical problem treated outside of my regular clinic, or have a change in my medications.    I understand that this medicine can affect my thinking, judgment and reaction time. Alcohol and drugs affect the brain and body, which can affect the safety of my driving. Being under the influence of alcohol or drugs can affect my decision-making, behaviors, personal safety, and the safety of others. Driving while impaired (DWI) can occur if a person is driving, operating, or in physical control of a car, motorcycle, boat, snowmobile, ATV, motorbike, off-road vehicle, or any other motor vehicle ([place] Statute 169A.20). I understand the risk if I choose to drive or operate any vehicle or machinery.    I understand that if I do not follow any of the conditions above, my prescriptions or treatment may be stopped or changed.          Opioids  What You Need to Know    What are opioids?   Opioids are pain medicines that must be prescribed by a doctor. They are also known as narcotics.     Examples are:   morphine (MS Contin, Irma)  oxycodone (Oxycontin)  oxycodone and acetaminophen (Percocet)  hydrocodone and acetaminophen (Vicodin, Norco)   fentanyl patch (Duragesic)   hydromorphone (Dilaudid)   methadone  codeine (Tylenol #3)     What do opioids do well?   Opioids are best for severe short-term pain such as after a surgery or injury. They may work well for cancer pain. They may help some people with long-lasting (chronic) pain.     What do opioids NOT do  well?   Opioids never get rid of pain entirely, and they don t work well for most patients with chronic pain. Opioids don t reduce swelling, one of the causes of pain.                                    Other ways to manage chronic pain and improve function include:     Treat the health problem that may be causing pain  Anti-inflammation medicines, which reduce swelling and tenderness, such as ibuprofen (Advil, Motrin) or naproxen (Aleve)  Acetaminophen (Tylenol)  Antidepressants and anti-seizure medicines, especially for nerve pain  Topical treatments such as patches or creams  Injections or nerve blocks  Chiropractic or osteopathic treatment  Acupuncture, massage, deep breathing, meditation, visual imagery, aromatherapy  Use heat or ice at the pain site  Physical therapy   Exercise  Stop smoking  Take part in therapy       Risks and side effects     Talk to your doctor before you start or decide to keep taking opioids. Possible side effects include:    Lowering your breathing rate enough to cause death  Overdose, including death, especially if taking higher than prescribed doses  Worse depression symptoms; less pleasure in things you usually enjoy  Feeling tired or sluggish  Slower thoughts or cloudy thinking  Being more sensitive to pain over time; pain is harder to control  Trouble sleeping or restless sleep  Changes in hormone levels (for example, less testosterone)  Changes in sex drive or ability to have sex  Constipation  Unsafe driving  Itching and sweating  Dizziness  Nausea, throwing up and dry mouth    What else should I know about opioids?    Opioids may lead to dependence, tolerance, or addiction.    Dependence means that if you stop or reduce the medicine too quickly, you will have withdrawal symptoms. These include loose poop (diarrhea), jitters, flu-like symptoms, nervousness and tremors. Dependence is not the same as addiction.                     Tolerance means needing higher doses over time to  get the same effect. This may increase the chance of serious side effects.    Addiction is when people improperly use a substance that harms their body, their mind or their relations with others. Use of opiates can cause a relapse of addiction if you have a history of drug or alcohol abuse.    People who have used opioids for a long time may have a lower quality of life, worse depression, higher levels of pain and more visits to doctors.    You can overdose on opioids. Take these steps to lower your risk of overdose:    Recognize the signs:  Signs of overdose include decrease or loss of consciousness (blackout), slowed breathing, trouble waking up and blue lips. If someone is worried about overdose, they should call 911.    Talk to your doctor about Narcan (naloxone).   If you are at risk for overdose, you may be given a prescription for Narcan. This medicine very quickly reverses the effects of opioids.   If you overdose, a friend or family member can give you Narcan while waiting for the ambulance. They need to know the signs of overdose and how to give Narcan.     Don't use alcohol or street drugs.   Taking them with opioids can cause death.    Do not take any of these medicines unless your doctor says it s OK. Taking these with opioids can cause death:  Benzodiazepines, such as lorazepam (Ativan), alprazolam (Xanax) or diazepam (Valium)  Muscle relaxers, such as cyclobenzaprine (Flexeril)  Sleeping pills like zolpidem (Ambien)   Other opioids      How to keep you and other people safe while taking opioids:    Never share your opioids with others.  Opioid medicines are regulated by the Drug Enforcement Agency (CLAU). Selling or sharing medications is a criminal act.    2. Be sure to store opioids in a secure place, locked up if possible. Young children can easily swallow them and overdose.    3. When you are traveling with your medicines, keep them in the original bottles. If you use a pill box, be sure you also  carry a copy of your medicine list from your clinic or pharmacy.    4. Safe disposal of opioids    Most pharmacies have places to get rid of medicine, called disposal kiosks. Medicine disposal options are also available in every [facility]. Search your county and  medication disposal  to find more options. You can find more details at:  https://[email or web address].[email or web address]./living-green/managing-unwanted-medications     I agree that my provider, clinic care team, and pharmacy may work with any city, state or federal law enforcement agency that investigates the misuse, sale, or other diversion of my controlled medicine. I will allow my provider to discuss my care with, or share a copy of, this agreement with any other treating provider, pharmacy or emergency room where I receive care.    I have read this agreement and have asked questions about anything I did not understand.    _______________________________________________________  Patient Signature - Lito Malone _____________________                   Date     _______________________________________________________  Provider Signature - Ike Eason PA-C   _____________________                   Date     _______________________________________________________  Witness Signature (required if provider not present while patient signing)   _____________________                   Date

## 2023-12-18 ENCOUNTER — PATIENT OUTREACH (OUTPATIENT)
Dept: GERIATRIC MEDICINE | Facility: CLINIC | Age: 63
End: 2023-12-18
Payer: COMMERCIAL

## 2023-12-18 NOTE — LETTER
December 18, 2023    LITO HENNING ZYWIEC  [address] [address]    Dear  Lito,    Welcome to [facility] s MSC+ health program. My name is JUAN Martinez. I am your MSC+ care coordinator. You are eligible for Care Coordination through [facility] MSC+ plan.    As your care coordinator, we ll:  Meet to go over your care coordination benefits  Talk about your physical and mental health care needs   Review your preventative care needs  Create a plan that meets your needs with the services you choose    What happens next?  I ll call you soon to introduce myself and tell you more about my role. We ll then plan time to go over your health and safety needs. Our goal is to keep you as healthy and independent as possible.    Soon, you will receive a new MSC+ member identification (ID) card from [facility]. When you receive it, please use this card along with your [place] [facility] Programs card and Prescription Drug Coverage Program card. When you receive, it please use this card where you get your health services. If you have Medicare, you will need to show your Medicare card when you get health services.    The MSC+ care coordination program is voluntary and offered to you at no cost. If you wish to stop being in the care coordination program or have questions, call me at [phone]. If you reach my voicemail, leave a message and your phone number. TTY users, call the [place] Relay at 204 or 1-256.627.2646 (jshmxb-lt-aimfbo relay service).    Sincerely,      JUAN Martinez  [phone]  [email or web address]    [id number] accepted   [id number]       [id number] (07/2022)

## 2023-12-18 NOTE — PROGRESS NOTES
[facility] Care Coordination Contact    Member became effective with [facility] on 12/1/2023 with Cherie MSC+.  Previous Health Plan:  [facility]  Previous Care System:  N/A  Previous care coordinators name and number: N/A  Waiver Type: N/A  No MMIS entries for member  UTF received: No UTF to request  Mailed welcome letter and Cherie When to Contact Your Care Coordinator  Address/Phone discrepancy: N/A  MnChoices: N/A    Bhumi Mantilla  Care Management Specialist  [facility]  [phone]

## 2023-12-20 ENCOUNTER — PATIENT OUTREACH (OUTPATIENT)
Dept: GERIATRIC MEDICINE | Facility: CLINIC | Age: 63
End: 2023-12-20
Payer: COMMERCIAL

## 2023-12-20 NOTE — Clinical Note
Hello,  I am the [facility] care coordinator for Lito STONERcortneyzoya.  I am writing to let you know I attempted to complete an initial assessment with Andres today, but he doesn't need anything at this time.  All of my documentation can be found in EPIC. Please do not hesitate to contact me with any questions or concerns.  Maci Rand [facility] Coordinator [phone]

## 2023-12-21 NOTE — PROGRESS NOTES
[facility] Care Coordination Contact      [facility] Refusal Telephone Assessment    Member refused home visit HRA on 12/20/2023 (reason: Andres is independent and will most likely be disenrolled from his MA plan when his renewal is due because of his monthly income).    ER visits: No  Hospitalizations: No  Health concerns: Andres denies major health concerns.   Falls/Injuries: No  ADL/IADL Dependencies: Andres is independent with ADL's and IADL's reports that he recently moved back to [place] to take care of his mother.     CC called to introduce self and CC role. Andres reports that he is independent with all ADL's and IADL's. Discussed some of the benefits available with [facility] program and eligibility. Andres reported that his income is over 2,800 a month. CC explained that he will most likely be disenrolled from the are MSC+ program when his renewal is due. Andres will be eligible to enroll in a Medicare Advantage plan at that time if he desires.     CC will watch the MA lapse report and give Andres a call when his MA lapses and give him more information about how to get connected with the Senior Linkage line to pick the best plan for him.       Member currently receiving the following home care services:   None  Member currently receiving the following community resources:  None  Informal support(s):  Family and Friends    Advanced Care Planning discussion, complete code section.    [facility] sponsored benefits: Shared information re: Silver Sneakers/gym memberships, ASA, Calcium +D.    Follow-Up Plan: Member informed of future contact, plan to f/u with member with a 6 month telephone assessment and offer a home visit.  Contact information shared with member and family, encouraged member to call with any questions or concerns at any time.    This CC note routed to PCP, Ike Eason.    JUAN Martinez  [facility]  [phone]

## 2023-12-31 ENCOUNTER — HEALTH MAINTENANCE LETTER (OUTPATIENT)
Age: 63
End: 2023-12-31

## 2024-01-10 NOTE — PROGRESS NOTES
"Per CC, mailed client a \"Refusal of Home Visit\" letter.      Leola Guerrero  Case Management Specialist   [facility]  [phone]    " 1. Annual physical exam  -     Lipid panel; Future    2. Giant cell arteritis (HCC)    3. Screening for colon cancer  -     Cologuard    4. Smoking

## 2024-01-11 DIAGNOSIS — E11.40 TYPE 2 DIABETES MELLITUS WITH DIABETIC NEUROPATHY, WITHOUT LONG-TERM CURRENT USE OF INSULIN (H): Primary | ICD-10-CM

## 2024-01-12 RX ORDER — ATORVASTATIN CALCIUM 80 MG/1
80 TABLET, FILM COATED ORAL DAILY
Qty: 90 TABLET | Refills: 3 | Status: SHIPPED | OUTPATIENT
Start: 2024-01-12 | End: 2025-01-06

## 2024-01-16 DIAGNOSIS — I73.9 PVD (PERIPHERAL VASCULAR DISEASE) (H): Primary | ICD-10-CM

## 2024-01-16 RX ORDER — CLOPIDOGREL BISULFATE 75 MG/1
75 TABLET ORAL DAILY
Qty: 90 TABLET | Refills: 3 | Status: SHIPPED | OUTPATIENT
Start: 2024-01-16 | End: 2025-01-10

## 2024-02-05 DIAGNOSIS — E11.65 INADEQUATELY CONTROLLED DIABETES MELLITUS (H): ICD-10-CM

## 2024-02-05 RX ORDER — CARVEDILOL 6.25 MG/1
6.25 TABLET ORAL 2 TIMES DAILY WITH MEALS
Qty: 180 TABLET | Refills: 0 | OUTPATIENT
Start: 2024-02-05

## 2024-02-10 RX ORDER — CLOPIDOGREL BISULFATE 75 MG/1
TABLET ORAL
Qty: 90 TABLET | Refills: 0 | OUTPATIENT
Start: 2024-02-10

## 2024-02-20 DIAGNOSIS — G89.4 CHRONIC PAIN SYNDROME: ICD-10-CM

## 2024-02-20 RX ORDER — GABAPENTIN 100 MG/1
100 CAPSULE ORAL 3 TIMES DAILY
Qty: 270 CAPSULE | Refills: 1 | Status: SHIPPED | OUTPATIENT
Start: 2024-02-20 | End: 2024-05-10

## 2024-03-06 DIAGNOSIS — I10 PRIMARY HYPERTENSION: ICD-10-CM

## 2024-03-06 DIAGNOSIS — E11.65 INADEQUATELY CONTROLLED DIABETES MELLITUS (H): ICD-10-CM

## 2024-03-07 RX ORDER — CARVEDILOL 6.25 MG/1
6.25 TABLET ORAL 2 TIMES DAILY WITH MEALS
Qty: 180 TABLET | Refills: 3 | Status: SHIPPED | OUTPATIENT
Start: 2024-03-07

## 2024-03-08 DIAGNOSIS — E11.65 INADEQUATELY CONTROLLED DIABETES MELLITUS (H): ICD-10-CM

## 2024-03-10 ENCOUNTER — HEALTH MAINTENANCE LETTER (OUTPATIENT)
Age: 64
End: 2024-03-10

## 2024-03-27 RX ORDER — ATORVASTATIN CALCIUM 80 MG/1
TABLET, FILM COATED ORAL
Qty: 90 TABLET | Refills: 0 | OUTPATIENT
Start: 2024-03-27

## 2024-05-02 ENCOUNTER — PATIENT OUTREACH (OUTPATIENT)
Dept: GERIATRIC MEDICINE | Facility: CLINIC | Age: 64
End: 2024-05-02
Payer: COMMERCIAL

## 2024-05-02 NOTE — PROGRESS NOTES
[facility] Care Coordination Contact    No longer active with [facility] community case management effective 1/31/2024.  Reason for community disenrollment: JUAN Cordero  [facility]  [phone]

## 2024-05-05 RX ORDER — EMPAGLIFLOZIN 25 MG/1
25 TABLET, FILM COATED ORAL DAILY
Qty: 90 TABLET | Refills: 0 | OUTPATIENT
Start: 2024-05-05

## 2024-05-09 ENCOUNTER — TELEPHONE (OUTPATIENT)
Dept: FAMILY MEDICINE | Facility: CLINIC | Age: 64
End: 2024-05-09
Payer: COMMERCIAL

## 2024-05-09 DIAGNOSIS — G89.4 CHRONIC PAIN SYNDROME: ICD-10-CM

## 2024-05-09 NOTE — TELEPHONE ENCOUNTER
General Call    Contacts         Type Contact Phone/Fax    05/09/2024 11:53 AM CDT Phone (Incoming) [id number] - [place], [place] - [address] (Pharmacy) [phone]          Reason for Call:  Patient is telling pharmacy he is completely out of medication    What are your questions or concerns:  Per pharmacy he is 12 days too early and they need the OK to fill early-

## 2024-05-10 RX ORDER — GABAPENTIN 100 MG/1
100 CAPSULE ORAL 3 TIMES DAILY
Qty: 270 CAPSULE | Refills: 1 | Status: SHIPPED | OUTPATIENT
Start: 2024-05-10 | End: 2024-05-10

## 2024-05-10 RX ORDER — GABAPENTIN 100 MG/1
100 CAPSULE ORAL 3 TIMES DAILY
Qty: 270 CAPSULE | Refills: 1 | Status: SHIPPED | OUTPATIENT
Start: 2024-05-10

## 2024-05-10 NOTE — TELEPHONE ENCOUNTER
Spoke with patient.  Pt is out of gabapentin; pended.    VIRY OreillyN, RN  [facility]  [phone]

## 2024-05-10 NOTE — TELEPHONE ENCOUNTER
Can we get more information on what he needs refilled? I'm happy to fill today.    Ike Eason PA-C

## 2024-05-21 ENCOUNTER — PATIENT OUTREACH (OUTPATIENT)
Dept: CARE COORDINATION | Facility: CLINIC | Age: 64
End: 2024-05-21
Payer: COMMERCIAL

## 2024-07-09 DIAGNOSIS — E11.40 TYPE 2 DIABETES MELLITUS WITH DIABETIC NEUROPATHY, WITHOUT LONG-TERM CURRENT USE OF INSULIN (H): Primary | ICD-10-CM

## 2024-07-09 RX ORDER — SEMAGLUTIDE 1.34 MG/ML
INJECTION, SOLUTION SUBCUTANEOUS
Qty: 12 ML | Refills: 0 | Status: SHIPPED | OUTPATIENT
Start: 2024-07-09

## 2024-07-28 ENCOUNTER — HEALTH MAINTENANCE LETTER (OUTPATIENT)
Age: 64
End: 2024-07-28

## 2024-08-26 ENCOUNTER — OFFICE VISIT (OUTPATIENT)
Dept: FAMILY MEDICINE | Facility: CLINIC | Age: 64
End: 2024-08-26
Payer: MEDICARE

## 2024-08-26 ENCOUNTER — ORDERS ONLY (AUTO-RELEASED) (OUTPATIENT)
Dept: FAMILY MEDICINE | Facility: CLINIC | Age: 64
End: 2024-08-26

## 2024-08-26 VITALS
HEIGHT: 70 IN | BODY MASS INDEX: 28.73 KG/M2 | HEART RATE: 82 BPM | RESPIRATION RATE: 16 BRPM | SYSTOLIC BLOOD PRESSURE: 122 MMHG | WEIGHT: 200.7 LBS | DIASTOLIC BLOOD PRESSURE: 78 MMHG | OXYGEN SATURATION: 98 % | TEMPERATURE: 96.8 F

## 2024-08-26 DIAGNOSIS — G61.0 AIDP (ACUTE INFLAMMATORY DEMYELINATING POLYNEUROPATHY) (H): ICD-10-CM

## 2024-08-26 DIAGNOSIS — Z00.00 ENCOUNTER FOR MEDICARE ANNUAL WELLNESS EXAM: Primary | ICD-10-CM

## 2024-08-26 DIAGNOSIS — Z23 NEED FOR SHINGLES VACCINE: ICD-10-CM

## 2024-08-26 DIAGNOSIS — I73.9 PVD (PERIPHERAL VASCULAR DISEASE) (H): ICD-10-CM

## 2024-08-26 DIAGNOSIS — M79.18 MYOFASCIAL PAIN: ICD-10-CM

## 2024-08-26 DIAGNOSIS — E78.00 PURE HYPERCHOLESTEROLEMIA: ICD-10-CM

## 2024-08-26 DIAGNOSIS — E11.40 TYPE 2 DIABETES MELLITUS WITH DIABETIC NEUROPATHY, WITHOUT LONG-TERM CURRENT USE OF INSULIN (H): ICD-10-CM

## 2024-08-26 DIAGNOSIS — E11.21 DIABETIC NEPHROPATHY ASSOCIATED WITH TYPE 2 DIABETES MELLITUS (H): ICD-10-CM

## 2024-08-26 DIAGNOSIS — G89.4 CHRONIC PAIN SYNDROME: ICD-10-CM

## 2024-08-26 DIAGNOSIS — Z12.5 SCREENING PSA (PROSTATE SPECIFIC ANTIGEN): ICD-10-CM

## 2024-08-26 DIAGNOSIS — E11.3211 MILD NONPROLIFERATIVE DIABETIC RETINOPATHY OF RIGHT EYE WITH MACULAR EDEMA ASSOCIATED WITH TYPE 2 DIABETES MELLITUS (H): ICD-10-CM

## 2024-08-26 DIAGNOSIS — E11.65 INADEQUATELY CONTROLLED DIABETES MELLITUS (H): ICD-10-CM

## 2024-08-26 DIAGNOSIS — Z12.11 SCREEN FOR COLON CANCER: ICD-10-CM

## 2024-08-26 DIAGNOSIS — I10 PRIMARY HYPERTENSION: ICD-10-CM

## 2024-08-26 DIAGNOSIS — Z23 NEED FOR TDAP VACCINATION: ICD-10-CM

## 2024-08-26 DIAGNOSIS — Z29.11 NEED FOR VACCINATION AGAINST RESPIRATORY SYNCYTIAL VIRUS: ICD-10-CM

## 2024-08-26 LAB
ALBUMIN SERPL BCG-MCNC: 4 G/DL (ref 3.5–5.2)
ALP SERPL-CCNC: 174 U/L (ref 40–150)
ALT SERPL W P-5'-P-CCNC: 8 U/L (ref 0–70)
ANION GAP SERPL CALCULATED.3IONS-SCNC: 11 MMOL/L (ref 7–15)
AST SERPL W P-5'-P-CCNC: 16 U/L (ref 0–45)
BILIRUB SERPL-MCNC: 0.3 MG/DL
BUN SERPL-MCNC: 22.8 MG/DL (ref 8–23)
CALCIUM SERPL-MCNC: 9.4 MG/DL (ref 8.8–10.4)
CHLORIDE SERPL-SCNC: 101 MMOL/L (ref 98–107)
CHOLEST SERPL-MCNC: 131 MG/DL
CREAT SERPL-MCNC: 1.02 MG/DL (ref 0.67–1.17)
CREAT UR-MCNC: 44 MG/DL
EGFRCR SERPLBLD CKD-EPI 2021: 82 ML/MIN/1.73M2
FASTING STATUS PATIENT QL REPORTED: NO
FASTING STATUS PATIENT QL REPORTED: NO
GLUCOSE SERPL-MCNC: 165 MG/DL (ref 70–99)
HBA1C MFR BLD: 7.8 % (ref 0–5.6)
HCO3 SERPL-SCNC: 26 MMOL/L (ref 22–29)
HDLC SERPL-MCNC: 42 MG/DL
LDLC SERPL CALC-MCNC: 62 MG/DL
MICROALBUMIN UR-MCNC: 734 MG/L
MICROALBUMIN/CREAT UR: 1668.18 MG/G CR (ref 0–17)
NONHDLC SERPL-MCNC: 89 MG/DL
POTASSIUM SERPL-SCNC: 4.6 MMOL/L (ref 3.4–5.3)
PROT SERPL-MCNC: 7 G/DL (ref 6.4–8.3)
PSA SERPL DL<=0.01 NG/ML-MCNC: 2.34 NG/ML (ref 0–4.5)
SODIUM SERPL-SCNC: 138 MMOL/L (ref 135–145)
TRIGL SERPL-MCNC: 133 MG/DL

## 2024-08-26 PROCEDURE — 82043 UR ALBUMIN QUANTITATIVE: CPT | Performed by: PHYSICIAN ASSISTANT

## 2024-08-26 PROCEDURE — 83036 HEMOGLOBIN GLYCOSYLATED A1C: CPT | Performed by: PHYSICIAN ASSISTANT

## 2024-08-26 PROCEDURE — 99214 OFFICE O/P EST MOD 30 MIN: CPT | Mod: 25 | Performed by: PHYSICIAN ASSISTANT

## 2024-08-26 PROCEDURE — 36415 COLL VENOUS BLD VENIPUNCTURE: CPT | Performed by: PHYSICIAN ASSISTANT

## 2024-08-26 PROCEDURE — G0402 INITIAL PREVENTIVE EXAM: HCPCS | Mod: 4MD | Performed by: PHYSICIAN ASSISTANT

## 2024-08-26 PROCEDURE — G0103 PSA SCREENING: HCPCS | Performed by: PHYSICIAN ASSISTANT

## 2024-08-26 PROCEDURE — 80053 COMPREHEN METABOLIC PANEL: CPT | Performed by: PHYSICIAN ASSISTANT

## 2024-08-26 PROCEDURE — 80061 LIPID PANEL: CPT | Performed by: PHYSICIAN ASSISTANT

## 2024-08-26 PROCEDURE — 82570 ASSAY OF URINE CREATININE: CPT | Performed by: PHYSICIAN ASSISTANT

## 2024-08-26 PROCEDURE — 99207 PR FOOT EXAM NO CHARGE: CPT | Performed by: PHYSICIAN ASSISTANT

## 2024-08-26 RX ORDER — HYDROCODONE BITARTRATE AND ACETAMINOPHEN 5; 325 MG/1; MG/1
1-2 TABLET ORAL EVERY 6 HOURS PRN
Qty: 40 TABLET | Refills: 0 | Status: SHIPPED | OUTPATIENT
Start: 2024-08-26

## 2024-08-26 RX ORDER — TIZANIDINE 2 MG/1
2 TABLET ORAL 3 TIMES DAILY
Qty: 90 TABLET | Refills: 1 | Status: SHIPPED | OUTPATIENT
Start: 2024-08-26

## 2024-08-26 ASSESSMENT — PAIN SCALES - PAIN ENJOYMENT GENERAL ACTIVITY SCALE (PEG)
PEG_TOTALSCORE: INCOMPLETE
AVG_PAIN_PASTWEEK: 5
INTERFERED_ENJOYMENT_LIFE: 5
INTERFERED_ENJOYMENT_LIFE: 5
AVG_PAIN_PASTWEEK: 5

## 2024-08-26 ASSESSMENT — PAIN SCALES - GENERAL: PAINLEVEL: SEVERE PAIN (6)

## 2024-08-26 NOTE — PATIENT INSTRUCTIONS
Patient Education   Preventive Care Advice   This is general advice given by our system to help you stay healthy. However, your care team may have specific advice just for you. Please talk to your care team about your preventive care needs.  Nutrition  Eat 5 or more servings of fruits and vegetables each day.  Try wheat bread, brown rice and whole grain pasta (instead of white bread, rice, and pasta).  Get enough calcium and vitamin D. Check the label on foods and aim for 100% of the RDA (recommended daily allowance).  Lifestyle  Exercise at least 150 minutes each week  (30 minutes a day, 5 days a week).  Do muscle strengthening activities 2 days a week. These help control your weight and prevent disease.  No smoking.  Wear sunscreen to prevent skin cancer.  Have a dental exam and cleaning every 6 months.  Yearly exams  See your health care team every year to talk about:  Any changes in your health.  Any medicines your care team has prescribed.  Preventive care, family planning, and ways to prevent chronic diseases.  Shots (vaccines)   HPV shots (up to age 26), if you've never had them before.  Hepatitis B shots (up to age 59), if you've never had them before.  COVID-19 shot: Get this shot when it's due.  Flu shot: Get a flu shot every year.  Tetanus shot: Get a tetanus shot every 10 years.  Pneumococcal, hepatitis A, and RSV shots: Ask your care team if you need these based on your risk.  Shingles shot (for age 50 and up)  General health tests  Diabetes screening:  Starting at age 35, Get screened for diabetes at least every 3 years.  If you are younger than age 35, ask your care team if you should be screened for diabetes.  Cholesterol test: At age 39, start having a cholesterol test every 5 years, or more often if advised.  Bone density scan (DEXA): At age 50, ask your care team if you should have this scan for osteoporosis (brittle bones).  Hepatitis C: Get tested at least once in your life.  STIs (sexually  transmitted infections)  Before age 24: Ask your care team if you should be screened for STIs.  After age 24: Get screened for STIs if you're at risk. You are at risk for STIs (including HIV) if:  You are sexually active with more than one person.  You don't use condoms every time.  You or a partner was diagnosed with a sexually transmitted infection.  If you are at risk for HIV, ask about PrEP medicine to prevent HIV.  Get tested for HIV at least once in your life, whether you are at risk for HIV or not.  Cancer screening tests  Cervical cancer screening: If you have a cervix, begin getting regular cervical cancer screening tests starting at age 21.  Breast cancer scan (mammogram): If you've ever had breasts, begin having regular mammograms starting at age 40. This is a scan to check for breast cancer.  Colon cancer screening: It is important to start screening for colon cancer at age 45.  Have a colonoscopy test every 10 years (or more often if you're at risk) Or, ask your provider about stool tests like a FIT test every year or Cologuard test every 3 years.  To learn more about your testing options, visit:   .  For help making a decision, visit:   https://bit.ly/zn74348.  Prostate cancer screening test: If you have a prostate, ask your care team if a prostate cancer screening test (PSA) at age 55 is right for you.  Lung cancer screening: If you are a current or former smoker ages 50 to 80, ask your care team if ongoing lung cancer screenings are right for you.  For informational purposes only. Not to replace the advice of your health care provider. Copyright   2023 [facility]. All rights reserved. Clinically reviewed by the [facility] Transitions Program. [employer] 290813 - REV 01/24.  Preventing Falls: Care Instructions  Injuries and health problems such as trouble walking or poor eyesight can increase your risk of falling. So can some medicines. But there are things you can do to help  "prevent falls. You can exercise to get stronger. You can also arrange your home to make it safer.    Talk to your doctor about the medicines you take. Ask if any of them increase the risk of falls and whether they can be changed or stopped.   Try to exercise regularly. It can help improve your strength and balance. This can help lower your risk of falling.     Practice fall safety and prevention.    Wear low-heeled shoes that fit well and give your feet good support. Talk to your doctor if you have foot problems that make this hard.  Carry a cellphone or wear a medical alert device that you can use to call for help.  Use stepladders instead of chairs to reach high objects. Don't climb if you're at risk for falls. Ask for help, if needed.  Wear the correct eyeglasses, if you need them.    Make your home safer.    Remove rugs, cords, clutter, and furniture from walkways.  Keep your house well lit. Use night-lights in hallways and bathrooms.  Install and use sturdy handrails on stairways.  Wear nonskid footwear, even inside. Don't walk barefoot or in socks without shoes.    Be safe outside.    Use handrails, curb cuts, and ramps whenever possible.  Keep your hands free by using a shoulder bag or backpack.  Try to walk in well-lit areas. Watch out for uneven ground, changes in pavement, and debris.  Be careful in the winter. Walk on the grass or gravel when sidewalks are slippery. Use de-icer on steps and walkways. Add non-slip devices to shoes.    Put grab bars and nonskid mats in your shower or tub and near the toilet. Try to use a shower chair or bath bench when bathing.   Get into a tub or shower by putting in your weaker leg first. Get out with your strong side first. Have a phone or medical alert device in the bathroom with you.   Where can you learn more?  Go to https://www.[employer].net/patiented  Enter G117 in the search box to learn more about \"Preventing Falls: Care Instructions.\"  Current as of: July 17, " 2023               Content Version: 14.0    8369-4009 [employer].   Care instructions adapted under license by your healthcare professional. If you have questions about a medical condition or this instruction, always ask your healthcare professional. [employer] disclaims any warranty or liability for your use of this information.

## 2024-08-26 NOTE — PROGRESS NOTES
Assessment & Plan     (Z00.00) Encounter for Medicare annual wellness exam  (primary encounter diagnosis)  Comment:   Plan: Patient declines vaccines at this time based on previous adverse reaction with demyelinating syndrome after COVID booster while he was living in [place].  Cologuard ordered, screening labs ordered today.  Follow-up in 6 months for diabetic medication check    (E11.40) Type 2 diabetes mellitus with diabetic neuropathy, without long-term current use of insulin (H)  Comment:   Plan: Comprehensive metabolic panel, Lipid panel         reflex to direct LDL Fasting, Hemoglobin A1c,         Albumin Random Urine Quantitative with Creat         Ratio        Neuropathy is stable, slight increase in his A1c, based on recent weight gain will increase his Ozempic to 2 mg once weekly, follow-up in 6 months    (Z23) Need for shingles vaccine  Comment:   Plan:     (Z12.5) Screening PSA (prostate specific antigen)  Comment:   Plan: PSA, screen            (Z23) Need for Tdap vaccination  Comment:   Plan:     (Z29.11) Need for vaccination against respiratory syncytial virus  Comment:   Plan:     (Z12.11) Screen for colon cancer  Comment:   Plan: COLOGUARD(EX[employer] SCIENCES)            (I73.9) PVD (peripheral vascular disease) (H24)  Comment:   Plan: Stable, no lower extremity pain or edema, continue taking Plavix and aspirin    (G61.0) AIDP (acute inflammatory demyelinating polyneuropathy) (H24)  Comment:   Plan: Managed by outside neurology, no worsening symptoms, vision is stable, seeing neuro-ophthalmology.    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  Increase Ozempic to 2 mg once weekly    (G89.4) Chronic pain syndrome  Comment:   Plan: tiZANidine (ZANAFLEX) 2 MG tablet        Recent flareup of episodic neck pain, see  "previous MRIs with multilevel fusion, refilled muscle relaxer as well as short supply of hydrocodone.    (E11.65) Inadequately controlled diabetes mellitus (H)  Comment:   Plan: Semaglutide, 2 MG/DOSE, (OZEMPIC) 8 MG/3ML pen        A1c remains greater than 7%, see above for Ozempic increase.  Repeat in 6 months    (M79.18) Myofascial pain  Comment: Plan: HYDROcodone-acetaminophen (NORCO) 5-325 MG         tablet        Chronic condition with acute flareup, pain medication sent today, no concerns on [facility]    (I10) Primary hypertension  Comment:   Plan: Blood pressure well controlled today. Advised to continue with current medication regimen and follow up in 12 months. Stressed the importance of regular blood pressure monitoring outside of clinic, regular aerobic exercise, low salt diet, refraining from smoking/tobacco products and moderation of alcohol use.      (E11.21) Diabetic nephropathy associated with type 2 diabetes mellitus (H)  Comment:   Plan: Patient currently taking losartan, repeat urinalysis today    (E78.00) Pure hypercholesterolemia  Comment:   Plan: Recheck cholesterol today and adjust statin dosing as indicated.  Continue with lifestyle changes: Low animal protein diet, 30 minutes of elevated heart rate per day, avoiding alcohol and tobacco products for heart health      BMI  Estimated body mass index is 29.13 kg/m  as calculated from the following:    Height as of this encounter: 1.768 m (5' 9.6\").    Weight as of this encounter: 91 kg (200 lb 11.2 oz).             West Morse is a 63 year old, presenting for the following health issues:  Follow Up (Neck pain) and Recheck Medication (Pt wants to discuss about his medications )        8/26/2024     8:07 AM   Additional Questions   Roomed by Dallin Seo   Accompanied by Self     History of Present Illness       Reason for visit:  Follow up   He is taking medications regularly.         Diabetes Follow-up    How often are you checking " your blood sugar? One time daily  What time of day are you checking your blood sugars (select all that apply)?  Before meals  Have you had any blood sugars above 200?  Yes rarely, eating more carbs now that he is cooking for his mom who has cancer (breast with liveer mets)  Have you had any blood sugars below 70?  No  What symptoms do you notice when your blood sugar is low?  None  What concerns do you have today about your diabetes? None and Other: elevated BS and weight gain   Do you have any of these symptoms? (Select all that apply)  Numbness in feet          Hyperlipidemia Follow-Up    Are you regularly taking any medication or supplement to lower your cholesterol?   Yes- atorvastatin 80 mg  Are you having muscle aches or other side effects that you think could be caused by your cholesterol lowering medication?  No    Hypertension Follow-up    Do you check your blood pressure regularly outside of the clinic? Yes   Are you following a low salt diet? Yes  Are your blood pressures ever more than 140 on the top number (systolic) OR more   than 90 on the bottom number (diastolic), for example 140/90? No    BP Readings from Last 2 Encounters:   08/26/24 122/78   12/08/23 138/78     Hemoglobin A1C (%)   Date Value   08/26/2024 7.8 (H)   12/08/2023 7.4 (H)     LDL Cholesterol Calculated   Date Value   05/05/2023 36 mg/dL   03/07/2022      Comment:     Cannot estimate LDL when triglyceride exceeds 400 mg/dL     LDL Cholesterol Direct (mg/dL)   Date Value   03/07/2022 61     How many servings of fruits and vegetables do you eat daily?  2-3  On average, how many sweetened beverages do you drink each day (Examples: soda, juice, sweet tea, etc.  Do NOT count diet or artificially sweetened beverages)?   0  How many days per week do you exercise enough to make your heart beat faster? 3 or less  How many minutes a day do you exercise enough to make your heart beat faster? 30 - 60  How many days per week do you miss taking your  medication? 0    Annual Wellness Visit     Patient has been advised of split billing requirements and indicates understanding: Yes         Health Care Directive  Patient does not have a Health Care Directive or Living Will: Patient states has Advance Directive and will bring in a copy to clinic.  In general, how would you rate your overall physical health? good  Do you have a special diet?  Diabetic and Carbohydrate counting         No data to display              Do you see a dentist two times every year?  Yes  Have you been more tired than usual lately?  No  If you drink alcohol do you typically have >3 drinks per day or >7 drinks per week? No  Do you have a current opioid prescription? No  Do you use any other controlled substances or medications that are not prescribed by a provider? None  Social History     Tobacco Use    Smoking status: Every Day     Current packs/day: 1.00     Types: Cigarettes    Smokeless tobacco: Former   Vaping Use    Vaping status: Never Used   Substance Use Topics    Alcohol use: Not Currently    Drug use: Never       Needs assistance for the following daily activities: no assistance needed  Which of the following safety concerns are present in your home?  none identified   Do you (or your family members) have any concerns about your safety while driving?  No  Do you have any of the following hearing concerns?: No hearing concerns  In the past 6 months, have you been bothered by leaking of urine? No        12/8/2023   Social Factors   Worry food won't last until get money to buy more No   Food not last or not have enough money for food? No   Do you have housing? (Housing is defined as stable permanent housing and does not include staying ouside in a car, in a tent, in an abandoned building, in an overnight shelter, or couch-surfing.) Yes   Are you worried about losing your housing? No   Lack of transportation? No   Unable to get utilities (heat,electricity)? No             8/26/2024  "  Fall Risk   Fallen 2 or more times in the past year? No   Trouble with walking or balance? Yes   Gait Speed Test (Document in seconds) 4.1   Gait Speed Test Interpretation Less than or equal to 5.00 seconds - PASS             Today's PHQ-2 Score:       8/26/2024     7:48 AM   PHQ-2 ( 1999 Pfizer)   Q1: Little interest or pleasure in doing things 1   Q2: Feeling down, depressed or hopeless 0   PHQ-2 Score 1   Q1: Little interest or pleasure in doing things Several days   Q2: Feeling down, depressed or hopeless Not at all   PHQ-2 Score 1       Last PSA: No results found for: \"PSA\"  ASCVD Risk   The ASCVD Risk score (Rubio MEZA, et al., 2019) failed to calculate for the following reasons:    The patient has a prior MI or stroke diagnosis            Reviewed and updated as needed this visit by Provider     Meds                    Current providers sharing in care for this patient include:  Patient Care Team:  Ike Eason PA-C as PCP - General (Physician Assistant)  Shahab Garcia DO as MD (Neurology)  Jordy Beckman MD as MD (Neurology)  Jordy Beckman MD as Assigned Neuroscience Provider  Ike Eason PA-C as Assigned PCP    The following health maintenance items are reviewed in Epic and correct as of today:  Health Maintenance   Topic Date Due    ADVANCE CARE PLANNING  Never done    COLORECTAL CANCER SCREENING  Never done    ZOSTER IMMUNIZATION (1 of 2) Never done    LUNG CANCER SCREENING  Never done    RSV VACCINE (Pregnancy & 60+) (1 - 1-dose 60+ series) Never done    DTAP/TDAP/TD IMMUNIZATION (1 - Tdap) 08/02/2019    Pneumococcal Vaccine: 65+ Years (2 of 2 - PPSV23 or PCV20) 09/26/2019    COVID-19 Vaccine (2 - 2023-24 season) 09/01/2023    MEDICARE ANNUAL WELLNESS VISIT  Never done    AORTIC ANEURYSM SCREENING (SYSTEM ASSIGNED)  Never done    BMP  05/05/2024    LIPID  05/05/2024    MICROALBUMIN  08/17/2024    DIABETIC FOOT EXAM  08/17/2024    NICOTINE/TOBACCO " "CESSATION COUNSELING Q 1 YR  08/17/2024    INFLUENZA VACCINE (1) 09/01/2024    EYE EXAM  11/16/2024    A1C  11/26/2024    ANNUAL REVIEW OF HM ORDERS  12/08/2024    FALL RISK ASSESSMENT  08/26/2025    HEPATITIS C SCREENING  Completed    HIV SCREENING  Completed    PHQ-2 (once per calendar year)  Completed    HPV IMMUNIZATION  Aged Out    MENINGITIS IMMUNIZATION  Aged Out    RSV MONOCLONAL ANTIBODY  Aged Out       Appropriate preventive services were discussed with this patient, including applicable screening as appropriate for fall prevention, nutrition, physical activity, Tobacco-use cessation, weight loss and cognition.  Checklist reviewing preventive services available has been given to the patient.           No data to display                  Vision Screen                 Wt Readings from Last 5 Encounters:   08/26/24 91 kg (200 lb 11.2 oz)   12/08/23 88.5 kg (195 lb)   08/17/23 95.7 kg (211 lb)   11/02/22 97.1 kg (214 lb)   10/05/22 97.1 kg (214 lb)       Objective    /78 (BP Location: Right arm, Patient Position: Sitting, Cuff Size: Adult Regular)   Pulse 82   Temp 96.8  F (36  C) (Temporal)   Resp 16   Ht 1.768 m (5' 9.6\")   Wt 91 kg (200 lb 11.2 oz)   SpO2 98%   BMI 29.13 kg/m    Body mass index is 29.13 kg/m .  Physical Exam   GENERAL: alert and no distress  NECK: no adenopathy, no asymmetry, masses, or scars  RESP: lungs clear to auscultation - no rales, rhonchi or wheezes  CV: regular rate and rhythm, normal S1 S2, no S3 or S4, no murmur, click or rub, no peripheral edema  ABDOMEN: soft, nontender, no hepatosplenomegaly, no masses and bowel sounds normal  MS: no gross musculoskeletal defects noted, no edema  Diabetic foot exam: normal DP and PT pulses, no trophic changes or ulcerative lesions, normal sensory exam, and ABnormal monofilament exam distal toes BL.            Signed Electronically by: Ike Eason PA-C    "

## 2024-08-31 DIAGNOSIS — E11.65 INADEQUATELY CONTROLLED DIABETES MELLITUS (H): ICD-10-CM

## 2024-09-04 DIAGNOSIS — E11.65 INADEQUATELY CONTROLLED DIABETES MELLITUS (H): ICD-10-CM

## 2024-09-05 RX ORDER — EMPAGLIFLOZIN 25 MG/1
25 TABLET, FILM COATED ORAL DAILY
Qty: 90 TABLET | Refills: 1 | Status: SHIPPED | OUTPATIENT
Start: 2024-09-05

## 2024-10-27 DIAGNOSIS — G89.4 CHRONIC PAIN SYNDROME: ICD-10-CM

## 2024-10-28 RX ORDER — TIZANIDINE 2 MG/1
2 TABLET ORAL 3 TIMES DAILY
Qty: 90 TABLET | Refills: 0 | Status: SHIPPED | OUTPATIENT
Start: 2024-10-28

## 2024-11-26 DIAGNOSIS — G89.4 CHRONIC PAIN SYNDROME: ICD-10-CM

## 2024-11-26 RX ORDER — TIZANIDINE 2 MG/1
2 TABLET ORAL 3 TIMES DAILY
Qty: 90 TABLET | Refills: 0 | Status: SHIPPED | OUTPATIENT
Start: 2024-11-26

## 2024-12-04 DIAGNOSIS — E11.65 INADEQUATELY CONTROLLED DIABETES MELLITUS (H): ICD-10-CM

## 2024-12-04 DIAGNOSIS — G89.4 CHRONIC PAIN SYNDROME: ICD-10-CM

## 2024-12-04 RX ORDER — GABAPENTIN 100 MG/1
100 CAPSULE ORAL 3 TIMES DAILY
Qty: 270 CAPSULE | Refills: 0 | Status: SHIPPED | OUTPATIENT
Start: 2024-12-04

## 2024-12-14 ENCOUNTER — HEALTH MAINTENANCE LETTER (OUTPATIENT)
Age: 64
End: 2024-12-14

## 2024-12-28 DIAGNOSIS — G89.4 CHRONIC PAIN SYNDROME: ICD-10-CM

## 2024-12-30 DIAGNOSIS — E11.65 INADEQUATELY CONTROLLED DIABETES MELLITUS (H): ICD-10-CM

## 2024-12-30 RX ORDER — EMPAGLIFLOZIN 25 MG/1
25 TABLET, FILM COATED ORAL DAILY
Qty: 90 TABLET | Refills: 0 | OUTPATIENT
Start: 2024-12-30

## 2024-12-30 RX ORDER — TIZANIDINE 2 MG/1
2 TABLET ORAL 3 TIMES DAILY
Qty: 90 TABLET | Refills: 0 | Status: SHIPPED | OUTPATIENT
Start: 2024-12-30

## 2025-01-04 DIAGNOSIS — I73.9 PVD (PERIPHERAL VASCULAR DISEASE): ICD-10-CM

## 2025-01-04 DIAGNOSIS — E11.40 TYPE 2 DIABETES MELLITUS WITH DIABETIC NEUROPATHY, WITHOUT LONG-TERM CURRENT USE OF INSULIN (H): ICD-10-CM

## 2025-01-06 RX ORDER — ATORVASTATIN CALCIUM 80 MG/1
80 TABLET, FILM COATED ORAL DAILY
Qty: 90 TABLET | Refills: 3 | Status: SHIPPED | OUTPATIENT
Start: 2025-01-06

## 2025-01-06 RX ORDER — CLOPIDOGREL BISULFATE 75 MG/1
75 TABLET ORAL DAILY
Qty: 90 TABLET | Refills: 3 | Status: SHIPPED | OUTPATIENT
Start: 2025-01-06

## 2025-01-24 ENCOUNTER — TELEPHONE (OUTPATIENT)
Dept: FAMILY MEDICINE | Facility: CLINIC | Age: 65
End: 2025-01-24

## 2025-01-24 NOTE — TELEPHONE ENCOUNTER
Prior Authorization Retail Medication Request    Medication/Dose: OZEMPIC (2MG/DOSE) 8 MG/3ML INJ SUSAN   Diagnosis and ICD code (if different than what is on RX):    Inadequately controlled diabetes mellitus (H) [E11.65]       Insurance   Primary: MEDICARE   Insurance ID:  [id number]     Pharmacy Information (if different than what is on RX)  Name:    [id number] - [place] [facility] [address]     Phone:    [phone]       Fax:  [phone]

## 2025-01-28 NOTE — TELEPHONE ENCOUNTER
PA Initiation    Medication: OZEMPIC (2 MG/DOSE) 8 MG/3ML SC SOPN  Insurance Company: [employer] - Phone [phone] Fax [phone]  Pharmacy Filling the Rx: [facility][id number] - [place] [address]  Filling Pharmacy Phone: [phone]  Filling Pharmacy Fax:    Start Date: 1/28/2025  Retail Pharmacy Prior Authorization Team   Phone: [phone]

## 2025-01-30 NOTE — TELEPHONE ENCOUNTER
Prior Authorization Approval    Medication: OZEMPIC (2 MG/DOSE) 8 MG/3ML SC SOPN  Authorization Effective Date: 1/1/2025  Authorization Expiration Date: 1/28/2026  Approved Dose/Quantity:   Reference #:     Insurance Company: [employer] - Phone [phone] Fax [phone]  Expected CoPay: $    CoPay Card Available:      Financial Assistance Needed: No  Which Pharmacy is filling the prescription: [id number] [place] [facility] [place] [address]  Pharmacy Notified: Yes  Patient Notified: The pharmacy will notify the patient.

## 2025-02-05 DIAGNOSIS — G89.4 CHRONIC PAIN SYNDROME: ICD-10-CM

## 2025-02-06 RX ORDER — TIZANIDINE 2 MG/1
2 TABLET ORAL 3 TIMES DAILY
Qty: 90 TABLET | Refills: 0 | Status: SHIPPED | OUTPATIENT
Start: 2025-02-06

## 2025-03-16 ENCOUNTER — HEALTH MAINTENANCE LETTER (OUTPATIENT)
Age: 65
End: 2025-03-16

## 2025-05-05 NOTE — LETTER
December 28, 2023    LITO HENNING ZYWIEC  [address] [facility] [address] [facility] [address]        Dear Lito:    As a member of [facility]'s [employer] program, we offer a health risk assessment at no cost to you. I know you don't want to have the assessment right now. If you change your mind, please call me at the number below.    Who performs the health risk assessment?  A [facility] Care Coordinator performs the assessment. Our Care Coordinators can also help you understand your benefits. They can tell you about services to aid you at home, such as managing your care with your doctors if your health worsens.    Our Care Coordinators are here for you if you need:  Support for activities you used to do by yourself (including making meals, bathing and paying bills)  Equipment for bathroom or home safety  Help finding a new place to live  Information on staying healthy, preventing falls and immunizations    Questions?  If you have questions, or you would like to do he assessment, call me at [phone]. TTY users call [phone]. I'm here from 8am to 5pm. I may reach out to you again soon.       Sincerely,         JUAN Martinez  [phone]  [email or web address]      \<[id number] accepted  [id number]>    [id number] (21/2021)                                 Outreach attempt was made to schedule a Medicare Wellness Visit. This was the first attempt. Contact was made, MW[facility] appointment scheduled.

## 2025-06-29 ENCOUNTER — HEALTH MAINTENANCE LETTER (OUTPATIENT)
Age: 65
End: 2025-06-29

## 2025-07-28 ENCOUNTER — PATIENT OUTREACH (OUTPATIENT)
Dept: CARE COORDINATION | Facility: CLINIC | Age: 65
End: 2025-07-28
Payer: MEDICARE